# Patient Record
Sex: MALE | Race: WHITE | NOT HISPANIC OR LATINO | URBAN - METROPOLITAN AREA
[De-identification: names, ages, dates, MRNs, and addresses within clinical notes are randomized per-mention and may not be internally consistent; named-entity substitution may affect disease eponyms.]

---

## 2017-12-09 ENCOUNTER — EMERGENCY (EMERGENCY)
Facility: HOSPITAL | Age: 72
LOS: 1 days | Discharge: HOME | End: 2017-12-09
Admitting: HOSPITALIST
Payer: MEDICARE

## 2017-12-09 DIAGNOSIS — Z98.89 OTHER SPECIFIED POSTPROCEDURAL STATES: Chronic | ICD-10-CM

## 2017-12-09 DIAGNOSIS — E11.9 TYPE 2 DIABETES MELLITUS WITHOUT COMPLICATIONS: ICD-10-CM

## 2017-12-09 DIAGNOSIS — Z98.1 ARTHRODESIS STATUS: Chronic | ICD-10-CM

## 2017-12-09 DIAGNOSIS — Z79.84 LONG TERM (CURRENT) USE OF ORAL HYPOGLYCEMIC DRUGS: ICD-10-CM

## 2017-12-09 DIAGNOSIS — Z87.898 PERSONAL HISTORY OF OTHER SPECIFIED CONDITIONS: Chronic | ICD-10-CM

## 2017-12-09 DIAGNOSIS — Z79.891 LONG TERM (CURRENT) USE OF OPIATE ANALGESIC: ICD-10-CM

## 2017-12-09 DIAGNOSIS — E78.00 PURE HYPERCHOLESTEROLEMIA, UNSPECIFIED: ICD-10-CM

## 2017-12-09 DIAGNOSIS — R07.89 OTHER CHEST PAIN: ICD-10-CM

## 2017-12-09 DIAGNOSIS — I10 ESSENTIAL (PRIMARY) HYPERTENSION: ICD-10-CM

## 2017-12-09 DIAGNOSIS — E78.5 HYPERLIPIDEMIA, UNSPECIFIED: ICD-10-CM

## 2017-12-09 DIAGNOSIS — Z79.899 OTHER LONG TERM (CURRENT) DRUG THERAPY: ICD-10-CM

## 2017-12-09 PROCEDURE — 71020: CPT | Mod: 26

## 2017-12-09 PROCEDURE — 99220: CPT

## 2017-12-09 PROCEDURE — 99285 EMERGENCY DEPT VISIT HI MDM: CPT

## 2017-12-09 PROCEDURE — 93010 ELECTROCARDIOGRAM REPORT: CPT

## 2017-12-10 PROCEDURE — 80061 LIPID PANEL: CPT

## 2017-12-10 PROCEDURE — 93306 TTE W/DOPPLER COMPLETE: CPT

## 2017-12-10 PROCEDURE — 82550 ASSAY OF CK (CPK): CPT

## 2017-12-10 PROCEDURE — 83690 ASSAY OF LIPASE: CPT

## 2017-12-10 PROCEDURE — 99284 EMERGENCY DEPT VISIT MOD MDM: CPT | Mod: 25

## 2017-12-10 PROCEDURE — 85610 PROTHROMBIN TIME: CPT

## 2017-12-10 PROCEDURE — 85027 COMPLETE CBC AUTOMATED: CPT

## 2017-12-10 PROCEDURE — 84484 ASSAY OF TROPONIN QUANT: CPT

## 2017-12-10 PROCEDURE — 71046 X-RAY EXAM CHEST 2 VIEWS: CPT

## 2017-12-10 PROCEDURE — G0378: CPT

## 2017-12-10 PROCEDURE — 80076 HEPATIC FUNCTION PANEL: CPT

## 2017-12-10 PROCEDURE — 80048 BASIC METABOLIC PNL TOTAL CA: CPT

## 2017-12-10 PROCEDURE — 99217: CPT

## 2017-12-10 PROCEDURE — 82948 REAGENT STRIP/BLOOD GLUCOSE: CPT

## 2017-12-10 PROCEDURE — 93005 ELECTROCARDIOGRAM TRACING: CPT

## 2017-12-10 PROCEDURE — 93306 TTE W/DOPPLER COMPLETE: CPT | Mod: 26

## 2017-12-10 PROCEDURE — 85730 THROMBOPLASTIN TIME PARTIAL: CPT

## 2017-12-10 PROCEDURE — 93010 ELECTROCARDIOGRAM REPORT: CPT

## 2017-12-10 PROCEDURE — 81003 URINALYSIS AUTO W/O SCOPE: CPT

## 2017-12-10 PROCEDURE — 96360 HYDRATION IV INFUSION INIT: CPT

## 2017-12-10 PROCEDURE — 83036 HEMOGLOBIN GLYCOSYLATED A1C: CPT

## 2017-12-10 PROCEDURE — 96372 THER/PROPH/DIAG INJ SC/IM: CPT | Mod: XU

## 2017-12-10 PROCEDURE — 82553 CREATINE MB FRACTION: CPT

## 2018-07-26 ENCOUNTER — APPOINTMENT (OUTPATIENT)
Dept: ORTHOPEDIC SURGERY | Facility: CLINIC | Age: 73
End: 2018-07-26
Payer: MEDICARE

## 2018-07-26 VITALS
BODY MASS INDEX: 32.13 KG/M2 | SYSTOLIC BLOOD PRESSURE: 149 MMHG | HEIGHT: 68 IN | HEART RATE: 87 BPM | WEIGHT: 212 LBS | DIASTOLIC BLOOD PRESSURE: 73 MMHG

## 2018-07-26 DIAGNOSIS — M47.816 SPONDYLOSIS W/OUT MYELOPATHY OR RADICULOPATHY, LUMBAR REGION: ICD-10-CM

## 2018-07-26 PROCEDURE — 73502 X-RAY EXAM HIP UNI 2-3 VIEWS: CPT | Mod: RT

## 2018-07-26 PROCEDURE — 72100 X-RAY EXAM L-S SPINE 2/3 VWS: CPT

## 2018-07-26 PROCEDURE — 99214 OFFICE O/P EST MOD 30 MIN: CPT

## 2018-07-30 ENCOUNTER — APPOINTMENT (OUTPATIENT)
Dept: MRI IMAGING | Facility: HOSPITAL | Age: 73
End: 2018-07-30
Payer: MEDICARE

## 2018-07-30 ENCOUNTER — OUTPATIENT (OUTPATIENT)
Dept: OUTPATIENT SERVICES | Facility: HOSPITAL | Age: 73
LOS: 1 days | End: 2018-07-30
Payer: MEDICARE

## 2018-07-30 DIAGNOSIS — Z98.89 OTHER SPECIFIED POSTPROCEDURAL STATES: Chronic | ICD-10-CM

## 2018-07-30 DIAGNOSIS — Z87.898 PERSONAL HISTORY OF OTHER SPECIFIED CONDITIONS: Chronic | ICD-10-CM

## 2018-07-30 DIAGNOSIS — Z98.1 ARTHRODESIS STATUS: Chronic | ICD-10-CM

## 2018-07-30 DIAGNOSIS — Z00.8 ENCOUNTER FOR OTHER GENERAL EXAMINATION: ICD-10-CM

## 2018-07-30 PROCEDURE — 73721 MRI JNT OF LWR EXTRE W/O DYE: CPT

## 2018-07-30 PROCEDURE — 73721 MRI JNT OF LWR EXTRE W/O DYE: CPT | Mod: 26,RT

## 2018-08-02 ENCOUNTER — APPOINTMENT (OUTPATIENT)
Dept: ORTHOPEDIC SURGERY | Facility: CLINIC | Age: 73
End: 2018-08-02
Payer: MEDICARE

## 2018-08-02 VITALS — DIASTOLIC BLOOD PRESSURE: 64 MMHG | HEART RATE: 71 BPM | SYSTOLIC BLOOD PRESSURE: 119 MMHG

## 2018-08-02 DIAGNOSIS — M16.11 UNILATERAL PRIMARY OSTEOARTHRITIS, RIGHT HIP: ICD-10-CM

## 2018-08-02 PROCEDURE — 99214 OFFICE O/P EST MOD 30 MIN: CPT

## 2018-08-06 ENCOUNTER — OUTPATIENT (OUTPATIENT)
Dept: OUTPATIENT SERVICES | Facility: HOSPITAL | Age: 73
LOS: 1 days | End: 2018-08-06
Payer: MEDICARE

## 2018-08-06 ENCOUNTER — APPOINTMENT (OUTPATIENT)
Dept: RADIOLOGY | Facility: CLINIC | Age: 73
End: 2018-08-06
Payer: MEDICARE

## 2018-08-06 DIAGNOSIS — M16.11 UNILATERAL PRIMARY OSTEOARTHRITIS, RIGHT HIP: ICD-10-CM

## 2018-08-06 DIAGNOSIS — Z98.89 OTHER SPECIFIED POSTPROCEDURAL STATES: Chronic | ICD-10-CM

## 2018-08-06 DIAGNOSIS — Z98.1 ARTHRODESIS STATUS: Chronic | ICD-10-CM

## 2018-08-06 DIAGNOSIS — Z87.898 PERSONAL HISTORY OF OTHER SPECIFIED CONDITIONS: Chronic | ICD-10-CM

## 2018-08-06 PROCEDURE — 27093 INJECTION FOR HIP X-RAY: CPT

## 2018-08-06 PROCEDURE — 73525 CONTRAST X-RAY OF HIP: CPT

## 2018-08-06 PROCEDURE — 27093 INJECTION FOR HIP X-RAY: CPT | Mod: RT

## 2018-08-06 PROCEDURE — 73525 CONTRAST X-RAY OF HIP: CPT | Mod: 26,RT

## 2018-08-13 PROBLEM — M16.11 PRIMARY LOCALIZED OSTEOARTHRITIS OF RIGHT HIP: Status: ACTIVE | Noted: 2018-07-26

## 2018-09-07 ENCOUNTER — APPOINTMENT (OUTPATIENT)
Dept: SURGERY | Facility: CLINIC | Age: 73
End: 2018-09-07
Payer: MEDICARE

## 2018-09-07 VITALS
BODY MASS INDEX: 32.58 KG/M2 | TEMPERATURE: 98.2 F | WEIGHT: 215 LBS | RESPIRATION RATE: 16 BRPM | SYSTOLIC BLOOD PRESSURE: 145 MMHG | HEART RATE: 63 BPM | OXYGEN SATURATION: 97 % | HEIGHT: 68 IN | DIASTOLIC BLOOD PRESSURE: 81 MMHG

## 2018-09-07 DIAGNOSIS — R10.31 RIGHT LOWER QUADRANT PAIN: ICD-10-CM

## 2018-09-07 DIAGNOSIS — Z87.19 PERSONAL HISTORY OF OTHER DISEASES OF THE DIGESTIVE SYSTEM: ICD-10-CM

## 2018-09-07 PROCEDURE — 99203 OFFICE O/P NEW LOW 30 MIN: CPT

## 2018-09-07 RX ORDER — LISINOPRIL 20 MG/1
20 TABLET ORAL
Refills: 0 | Status: ACTIVE | COMMUNITY

## 2018-09-07 RX ORDER — ASPIRIN/ACETAMINOPHEN/CAFFEINE 500-325-65
325 POWDER IN PACKET (EA) ORAL
Refills: 0 | Status: ACTIVE | COMMUNITY

## 2018-12-17 ENCOUNTER — APPOINTMENT (OUTPATIENT)
Dept: NEUROSURGERY | Facility: CLINIC | Age: 73
End: 2018-12-17
Payer: MEDICARE

## 2018-12-17 VITALS
RESPIRATION RATE: 16 BRPM | DIASTOLIC BLOOD PRESSURE: 74 MMHG | SYSTOLIC BLOOD PRESSURE: 134 MMHG | TEMPERATURE: 98 F | WEIGHT: 219 LBS | HEIGHT: 68 IN | HEART RATE: 69 BPM | OXYGEN SATURATION: 95 % | BODY MASS INDEX: 33.19 KG/M2

## 2018-12-17 PROCEDURE — 99204 OFFICE O/P NEW MOD 45 MIN: CPT

## 2019-02-01 ENCOUNTER — APPOINTMENT (OUTPATIENT)
Dept: ORTHOPEDIC SURGERY | Facility: CLINIC | Age: 74
End: 2019-02-01
Payer: MEDICARE

## 2019-02-01 VITALS — BODY MASS INDEX: 33.19 KG/M2 | HEIGHT: 68 IN | WEIGHT: 219 LBS

## 2019-02-01 DIAGNOSIS — M25.562 PAIN IN LEFT KNEE: ICD-10-CM

## 2019-02-01 DIAGNOSIS — M17.12 UNILATERAL PRIMARY OSTEOARTHRITIS, LEFT KNEE: ICD-10-CM

## 2019-02-01 DIAGNOSIS — M70.42 PREPATELLAR BURSITIS, LEFT KNEE: ICD-10-CM

## 2019-02-01 PROCEDURE — 99213 OFFICE O/P EST LOW 20 MIN: CPT

## 2019-02-01 PROCEDURE — 73564 X-RAY EXAM KNEE 4 OR MORE: CPT | Mod: LT

## 2019-02-01 NOTE — ADDENDUM
[FreeTextEntry1] : I, Xiao Gan, acted solely as a scribe for Dr. Chad Prabhakar on this date 02/01/2019 .

## 2019-02-01 NOTE — DISCUSSION/SUMMARY
[de-identified] : The underlying pathophysiology was reviewed in great detail with the patient as well as the various treatment options, including ice, analgesics, NSAIDs, Physical therapy, steroid injections.\par \par Activity modifications and restrictions were discussed. I recommend avoid kneeling or to utilized knee pads when kneeling. \par \par FU PRN.

## 2019-02-01 NOTE — END OF VISIT
[FreeTextEntry3] : All medical record entries made by the Rmibe were at my, Dr. Chad Prabhakar, direction and personally dictated by me on 02/01/2019. I have reviewed the chart and agree that the record accurately reflects my personal performance of the history, physical exam, assessment and plan. I have also personally directed, reviewed, and agreed with the chart.

## 2019-02-01 NOTE — PHYSICAL EXAM
[Normal RLE] : Right Lower Extremity: No scars, rashes, lesions, ulcers, skin intact [Normal LLE] : Left Lower Extremity: No scars, rashes, lesions, ulcers, skin intact [Normal Touch] : sensation intact for touch [Normal] : No swelling, no edema, normal pedal pulses and normal temperature [Poor Appearance] : well-appearing [Acute Distress] : not in acute distress [Obese] : not obese [de-identified] : Left Lower Extremity\par o Knee :\par ¦ Inspection/Palpation : prepatellar and medial joint line tenderness, no lateral joint line tenderness, no swelling, mild varus alignment\par ¦ Range of Motion : 0 - 110 degrees, no crepitus\par ¦ Stability : no valgus or varus instability present on provocative testing, Lachman’s Test (-)\par ¦ Strength : flexion and extension 5/5\par o Muscle Bulk : normal muscle bulk present\par o Skin : no erythema, no ecchymosis \par o Sensation : sensation to pin intact\par o Vascular Exam : no edema, no cyanosis, dorsalis pedis artery pulse 2+, posterior tibial artery pulse 2+  [de-identified] : o Left Knee : AP, lateral, sunrise, and Lyon views of the knee were obtained, there are no soft tissue abnormalities, no fractures, mild tricompartmental osteoarthritis with moderate medial joint space narrowing, slight calcifications at the lateral condyle

## 2019-02-01 NOTE — HISTORY OF PRESENT ILLNESS
[de-identified] : 73 year old male presents for an evaluation of left knee pain that began approximately 1 week ago when he was kneeling and waxing his living room floor. He reports that he has tenderness to the touch along the medial aspect of his knee and says that last week it was warm to the touch and that has since dissipated. Pt says that he is also says that he experiences stiffness of his left knee. He has no other complaints at this time.

## 2019-07-01 ENCOUNTER — APPOINTMENT (OUTPATIENT)
Dept: CARDIOLOGY | Facility: CLINIC | Age: 74
End: 2019-07-01
Payer: MEDICARE

## 2019-07-01 ENCOUNTER — NON-APPOINTMENT (OUTPATIENT)
Age: 74
End: 2019-07-01

## 2019-07-01 VITALS
SYSTOLIC BLOOD PRESSURE: 187 MMHG | BODY MASS INDEX: 31.98 KG/M2 | HEIGHT: 68 IN | HEART RATE: 69 BPM | DIASTOLIC BLOOD PRESSURE: 75 MMHG | OXYGEN SATURATION: 97 % | WEIGHT: 211 LBS | RESPIRATION RATE: 15 BRPM

## 2019-07-01 DIAGNOSIS — I65.29 OCCLUSION AND STENOSIS OF UNSPECIFIED CAROTID ARTERY: ICD-10-CM

## 2019-07-01 DIAGNOSIS — I10 ESSENTIAL (PRIMARY) HYPERTENSION: ICD-10-CM

## 2019-07-01 DIAGNOSIS — I25.10 ATHEROSCLEROTIC HEART DISEASE OF NATIVE CORONARY ARTERY W/OUT ANGINA PECTORIS: ICD-10-CM

## 2019-07-01 DIAGNOSIS — E78.5 HYPERLIPIDEMIA, UNSPECIFIED: ICD-10-CM

## 2019-07-01 PROCEDURE — 93000 ELECTROCARDIOGRAM COMPLETE: CPT

## 2019-07-01 PROCEDURE — 93306 TTE W/DOPPLER COMPLETE: CPT

## 2019-07-01 PROCEDURE — 99205 OFFICE O/P NEW HI 60 MIN: CPT

## 2019-07-09 ENCOUNTER — APPOINTMENT (OUTPATIENT)
Dept: CARDIOLOGY | Facility: CLINIC | Age: 74
End: 2019-07-09
Payer: MEDICARE

## 2019-07-09 PROCEDURE — 78452 HT MUSCLE IMAGE SPECT MULT: CPT

## 2019-07-09 PROCEDURE — 93015 CV STRESS TEST SUPVJ I&R: CPT

## 2019-07-09 PROCEDURE — A9500: CPT

## 2020-03-09 ENCOUNTER — APPOINTMENT (OUTPATIENT)
Dept: ORTHOPEDIC SURGERY | Facility: CLINIC | Age: 75
End: 2020-03-09
Payer: MEDICARE

## 2020-03-09 PROCEDURE — 73030 X-RAY EXAM OF SHOULDER: CPT | Mod: RT

## 2020-03-09 PROCEDURE — 99213 OFFICE O/P EST LOW 20 MIN: CPT | Mod: 25

## 2020-03-09 PROCEDURE — 20611 DRAIN/INJ JOINT/BURSA W/US: CPT | Mod: RT

## 2020-03-09 NOTE — END OF VISIT
[FreeTextEntry3] : All medical record entries made by the Rmibsatya were at my, Dr. Chad Prabhakar, direction and personally dictated by me on 03/09/2020. I have reviewed the chart and agree that the record accurately reflects my personal performance of the history, physical exam, assessment and plan. I have also personally directed, reviewed, and agreed with the chart.

## 2020-03-09 NOTE — DISCUSSION/SUMMARY
[de-identified] : The underlying pathophysiology was reviewed in great detail with the patient as well as the various treatment options, including ice, analgesics, NSAIDs, Physical therapy, steroid injections, right TSA. \par \par The patient wishes to proceed with an ultrasound-guided DUROLANE injection of the right shoulder. \par \par FU PRN.

## 2020-03-09 NOTE — PROCEDURE
[de-identified] : At this point I recommended a therapeutic injection and under sterile precautions, with ultrasound guidance, an injection of 3 cc of Durolane (Lot: [id number], Expiration: 05/2022)- was placed into the subacromial space of the Right shoulder without complication.

## 2020-03-09 NOTE — PHYSICAL EXAM
[Normal RUE] : Right Upper Extremity: No scars, rashes, lesions, ulcers, skin intact [Normal Touch] : sensation intact for touch [Normal] : No swelling, no edema, normal pedal pulses and normal temperature [Poor Appearance] : well-appearing [Acute Distress] : not in acute distress [Obese] : not obese [de-identified] : Right Upper Extremity\par o Shoulder :\par ¦ Inspection/Palpation : no tenderness, no swelling, no deformities\par ¦ Range of Motion : ACTIVE FORWARD ELEVATION: Measured at 115 degrees, ACTIVE EXTERNAL ROTATION: Measured at 55 degrees, ACTIVE INTERNAL ROTATION: Measured at PSIS \par ¦ Strength : external rotation 5/5, internal rotation 5/5, supraspinatus 5/5\par ¦ Stability : no joint instability on provocative testing\par o Upper Arm : no tenderness, no swelling, no deformities\par o Muscle Bulk : no atrophy\par o Sensation : sensation intact to light touch\par o Skin : no skin rash or discoloration\par o Vascular Exam : no edema, no cyanosis, radial and ulnar pulses normal  [de-identified] : o Right Shoulder : Grashey, Axillary and Outlet views were obtained, there are no soft tissue abnormalities, no fractures, alignment is normal, advanced glenohumeral osteoarthritis with near bone on bone apposition, normal bone density, no bony lesions.

## 2020-03-09 NOTE — HISTORY OF PRESENT ILLNESS
[de-identified] : 74 year old male presents for an evaluation of chronic right shoulder pain. Patient reports a long standing history of right shoulder pain that has worsened drastically over the past three months since December 2019. Patient reports a sharp and burning pain located in the anterior aspect of his right shoulder that is constant in nature. His symptoms are exacerbated with certain shoulder rotations, and notes limited ROM in internal rotation and flexion. He denies that his shoulder pain wakes him at night. Patient has taken Advil, Aleve and Mobic for pain relief with mild relief in his symptoms.

## 2020-03-09 NOTE — ADDENDUM
[FreeTextEntry1] : I, Ericka Ron, acted solely as a scribe for Dr. Chad Prabhakar on this date 03/09/2020.

## 2020-05-14 ENCOUNTER — OUTPATIENT (OUTPATIENT)
Dept: OUTPATIENT SERVICES | Facility: HOSPITAL | Age: 75
LOS: 1 days | End: 2020-05-14
Payer: MEDICARE

## 2020-05-14 ENCOUNTER — APPOINTMENT (OUTPATIENT)
Dept: MRI IMAGING | Facility: CLINIC | Age: 75
End: 2020-05-14
Payer: MEDICARE

## 2020-05-14 DIAGNOSIS — Z98.1 ARTHRODESIS STATUS: Chronic | ICD-10-CM

## 2020-05-14 DIAGNOSIS — Z87.898 PERSONAL HISTORY OF OTHER SPECIFIED CONDITIONS: Chronic | ICD-10-CM

## 2020-05-14 DIAGNOSIS — Z00.8 ENCOUNTER FOR OTHER GENERAL EXAMINATION: ICD-10-CM

## 2020-05-14 DIAGNOSIS — Z98.89 OTHER SPECIFIED POSTPROCEDURAL STATES: Chronic | ICD-10-CM

## 2020-05-14 PROCEDURE — 70549 MR ANGIOGRAPH NECK W/O&W/DYE: CPT | Mod: 26

## 2020-05-14 PROCEDURE — A9585: CPT

## 2020-05-14 PROCEDURE — 70549 MR ANGIOGRAPH NECK W/O&W/DYE: CPT

## 2020-11-02 ENCOUNTER — OUTPATIENT (OUTPATIENT)
Dept: OUTPATIENT SERVICES | Facility: HOSPITAL | Age: 75
LOS: 1 days | End: 2020-11-02
Payer: MEDICARE

## 2020-11-02 ENCOUNTER — APPOINTMENT (OUTPATIENT)
Dept: MRI IMAGING | Facility: HOSPITAL | Age: 75
End: 2020-11-02
Payer: MEDICARE

## 2020-11-02 ENCOUNTER — TRANSCRIPTION ENCOUNTER (OUTPATIENT)
Age: 75
End: 2020-11-02

## 2020-11-02 DIAGNOSIS — Z98.89 OTHER SPECIFIED POSTPROCEDURAL STATES: Chronic | ICD-10-CM

## 2020-11-02 DIAGNOSIS — Z00.8 ENCOUNTER FOR OTHER GENERAL EXAMINATION: ICD-10-CM

## 2020-11-02 DIAGNOSIS — Z87.898 PERSONAL HISTORY OF OTHER SPECIFIED CONDITIONS: Chronic | ICD-10-CM

## 2020-11-02 DIAGNOSIS — Z98.1 ARTHRODESIS STATUS: Chronic | ICD-10-CM

## 2020-11-02 PROCEDURE — 73721 MRI JNT OF LWR EXTRE W/O DYE: CPT

## 2020-11-02 PROCEDURE — 73721 MRI JNT OF LWR EXTRE W/O DYE: CPT | Mod: 26,LT

## 2020-12-10 ENCOUNTER — APPOINTMENT (OUTPATIENT)
Dept: ORTHOPEDIC SURGERY | Facility: CLINIC | Age: 75
End: 2020-12-10
Payer: MEDICARE

## 2020-12-10 VITALS — WEIGHT: 211 LBS | BODY MASS INDEX: 31.98 KG/M2 | HEIGHT: 68 IN

## 2020-12-10 DIAGNOSIS — S76.111A STRAIN OF RIGHT QUADRICEPS MUSCLE, FASCIA AND TENDON, INITIAL ENCOUNTER: ICD-10-CM

## 2020-12-10 DIAGNOSIS — M77.01 MEDIAL EPICONDYLITIS, RIGHT ELBOW: ICD-10-CM

## 2020-12-10 DIAGNOSIS — M77.02 MEDIAL EPICONDYLITIS, LEFT ELBOW: ICD-10-CM

## 2020-12-10 PROCEDURE — 99214 OFFICE O/P EST MOD 30 MIN: CPT

## 2020-12-10 PROCEDURE — 73564 X-RAY EXAM KNEE 4 OR MORE: CPT | Mod: RT

## 2020-12-12 NOTE — PHYSICAL EXAM
[Normal RUE] : Right Upper Extremity: No scars, rashes, lesions, ulcers, skin intact [Normal Touch] : sensation intact for touch [Normal] : No swelling, no edema, normal pedal pulses and normal temperature [Poor Appearance] : well-appearing [Acute Distress] : not in acute distress [Obese] : not obese [de-identified] : Right Upper Extremity\par o Elbow :\par ¦ Inspection/Palpation : tenderness to palpation medial epicondyle, no swelling, no deformities\par ¦ Range of Motion : full and painless in all planes, no crepitus\par ¦ Strength : flexion and extension 5/5\par ¦ Stability : no joint instability on provocative testing\par o Muscle Bulk : no atrophy\par o Sensation : sensation intact to light touch\par o Skin : no skin lesions, no discoloration\par o Vascular Exam : no edema, no cyanosis, radial and ulnar pulses normal \par o Special Tests: pain with resisted wrist flexion. \par \par Left Upper Extremity\par o Elbow :\par ¦ Inspection/Palpation: tenderness to palpation medial epicondyle, no swelling, no deformities\par ¦ Range of Motion : full and painless in all planes, no crepitus\par ¦ Strength : flexion and extension 5/5\par ¦ Stability : no joint instability on provocative testing\par o Muscle Bulk : no atrophy\par o Sensation : sensation intact to light touch\par o Skin : no skin lesions, no discoloration\par o Vascular Exam : no edema, no cyanosis, radial and ulnar pulses normal \par o Special Tests: pain with resisted wrist flexion. \par \par Right Lower Extremity\par o Knee :\par ¦ Inspection/Palpation : no tenderness to palpation, no swelling, no deformity\par ¦ Range of Motion : 0 - 120 degrees, no crepitus\par ¦ Stability : no valgus or varus instability present on provocative testing, Lachman’s Test (-)\par ¦ Strength : flexion and extension 5/5\par o Muscle Bulk : normal muscle bulk present\par o Skin : no erythema, no ecchymosis\par o Sensation : sensation to pin intact\par o Vascular Exam : no edema, no cyanosis, dorsalis pedis artery pulse 2+, posterior tibial artery pulse 2+\par \par Left Lower Extremity\par o Knee :\par ¦ Inspection/Palpation : no tenderness to palpation, no swelling, no deformity\par ¦ Range of Motion : 0 -120 degrees, no crepitus\par ¦ Stability : no valgus or varus instability present on provocative testing, Lachman’s Test (-)\par ¦ Strength : flexion and extension 5/5\par o Muscle Bulk : normal muscle bulk present\par o Skin : no erythema, no ecchymosis\par o Sensation : sensation to pin intact\par o Vascular Exam : no edema, no cyanosis, dorsalis pedis artery pulse 2+, posterior tibial artery pulse 2+ [de-identified] : o Right Knee : AP, lateral, sunrise, and Lyon views of the knee were obtained, there are no soft tissue abnormalities, no fractures, alignment is normal, normal appearing joint spaces, normal bone density, no bony lesions, calcification in distal quadriceps muscle, mild tricompartmental osteoarthritis, chronic tibial tubercle apophysitis\par \par

## 2020-12-12 NOTE — DISCUSSION/SUMMARY
[de-identified] : The underlying pathophysiology was reviewed in great detail with the patient as well as the various treatment options, including ice, analgesics, NSAIDs, Physical therapy, steroid injections, hyaluronic gel injections. \par \par Activity modifications and restrictions were discussed. I recommend that he  avoid heavy gripping/squeezing. \par \par Activity modifications and restrictions were discussed. I advised avoiding deep bending, squatting and high intensity activity.\par \par A home exercise sheet was given and discussed with the patient to follow.\par \par FU 6 weeks or prn. \par \par All questions were answered, all alternatives discussed and the patient is in complete agreement with that plan. Follow-up appointment as instructed. Any issues and the patient will call or come in sooner.

## 2020-12-12 NOTE — HISTORY OF PRESENT ILLNESS
[de-identified] : CHRISTY VILLANUEVA is a 75 year male presenting to the office complaining of right knee and bilateral elbow pain. He  presents to the office ambulating independently. Patient reports knee and quadriceps pain began on 12/04/2020 when he tripped up the stairs landing on his right knee.   The patient describes the pain as a dull aching, and occasionally sharp pain localized to the anterior medial aspect of his right quadriceps that is intermittent in nature. His  symptoms are exacerbated with weightbearing but his pain has decreased since initial onset.  Pain is alleviated with rest.  Patient denies instability, catching or locking of the knee. 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 \par He is also complaining of bilateral elbow pain. Patient reports pain intermittently for months, with worsening pain over the past few weeks.  Patient denies injury or trauma to the area. The patient describes the pain as a dull aching, and occasionally sharp pain localized to the medial aspect of his bilateral elbows that is intermittent in nature. His  symptoms are exacerbated with any movement of the elbow, and gripping of the hand. Patient reports the pain is not waking him  up at night.  Patient reports associated weakness. Denies numbness and tingling in the upper extremity. Patient is taking Tylenol for pain relief with moderate relief in symptoms. Patient denies any other complaints at this time.

## 2020-12-22 ENCOUNTER — EMERGENCY (EMERGENCY)
Facility: HOSPITAL | Age: 75
LOS: 1 days | Discharge: HOME | End: 2020-12-22
Attending: INTERNAL MEDICINE | Admitting: INTERNAL MEDICINE
Payer: MEDICARE

## 2020-12-22 VITALS
OXYGEN SATURATION: 97 % | SYSTOLIC BLOOD PRESSURE: 112 MMHG | HEART RATE: 66 BPM | TEMPERATURE: 99 F | RESPIRATION RATE: 18 BRPM | DIASTOLIC BLOOD PRESSURE: 65 MMHG | WEIGHT: 212.08 LBS | HEIGHT: 68 IN

## 2020-12-22 DIAGNOSIS — Z98.1 ARTHRODESIS STATUS: Chronic | ICD-10-CM

## 2020-12-22 DIAGNOSIS — Z98.89 OTHER SPECIFIED POSTPROCEDURAL STATES: Chronic | ICD-10-CM

## 2020-12-22 DIAGNOSIS — Z87.898 PERSONAL HISTORY OF OTHER SPECIFIED CONDITIONS: Chronic | ICD-10-CM

## 2020-12-22 DIAGNOSIS — Z20.828 CONTACT WITH AND (SUSPECTED) EXPOSURE TO OTHER VIRAL COMMUNICABLE DISEASES: ICD-10-CM

## 2020-12-22 LAB — SARS-COV-2 RNA SPEC QL NAA+PROBE: SIGNIFICANT CHANGE UP

## 2020-12-22 PROCEDURE — U0003: CPT

## 2020-12-22 PROCEDURE — 99283 EMERGENCY DEPT VISIT LOW MDM: CPT | Mod: CS

## 2020-12-22 PROCEDURE — 99283 EMERGENCY DEPT VISIT LOW MDM: CPT

## 2020-12-22 NOTE — ED PROVIDER NOTE - ATTENDING CONTRIBUTION TO CARE
76 y/o M with PMH of DM, HLD, gout, HTN presents to the ED requesting a COVID swab. patient was in close contact with a friend whom tested positive for COVID 2-3 days ago. Pt denies all medical symptoms. COVID swab sent. patient instructed to quarantine until results  Dr. Burroughs:  I have reviewed and discussed with the PA/ resident the case specifics, including the history, physical assessment, evaluation, conclusion, laboratory results, and medical plan. I agree with the contents, and conclusions. I have personally examined, and interviewed the patient.

## 2020-12-22 NOTE — ED PROVIDER NOTE - NSFOLLOWUPINSTRUCTIONS_ED_ALL_ED_FT
See attached for COVID-19 info / fact sheet.   Please stay home for 10 days.   Take Tylenol 650mg every 6 hours as needed for fever or pain.   Drink fluids, rest, and sanitize at home!  Home quarantine is recommended to monitor symptoms.   Isolate from others as much as possible.   We sent COVID testing that may take up to 2 days for you to receive a result.  We will contact you if there are any findings.   Worsening, continued or ANY new concerning symptoms return to the emergency department.

## 2020-12-22 NOTE — ED PROVIDER NOTE - OBJECTIVE STATEMENT
74 y/o M with PMH of DM, HLD, gout, HTN presents to the ED requesting a COVID swab. patient was in close contact with a friend whom tested positive for COVID 2-3 days ago. Pt denies all medical symptoms

## 2020-12-22 NOTE — ED PROVIDER NOTE - PMH
Chronic pain  lumbar, left hip  Diabetes type 2, controlled    Gout    [place] (hard of hearing), bilateral  b/l hearing aids  HTN (hypertension)    Hypercholesteremia    Meniscus tear    Seizure disorder  on meds last seizure 2011  Septicemia  Pt states he had spsis 2010 and was in hospital for 40 days, sent home on IV antibiotics  Spinal stenosis of thoracic region    Tennis elbow    Thoracic spondylosis

## 2020-12-22 NOTE — ED PROVIDER NOTE - PHYSICAL EXAMINATION
Gen: Well appearing in NAD  Head: NC/AT  Neck: trachea midline  Resp:  No distress, CTA b/l   Heart: s1/s2, RRR  Ext: no deformities  Neuro:  A&O appears non focal  Skin:  Warm and dry as visualized  Psych:  Normal affect and mood

## 2020-12-22 NOTE — ED ADULT NURSE NOTE - PMH
Chronic pain  lumbar, left hip  Diabetes type 2, controlled    Gout    [place] (hard of hearing), bilateral  b/l hearing aids  HTN (hypertension)    Hypercholesteremia    Meniscus tear    Seizure disorder  on meds last seizure 2011  Septicemia  Pt states he had spsis 2010 and was in hospital for 40 days, sent home on IV antibiotics  Spinal stenosis of thoracic region    Tennis elbow    Thoracic spondylosis    
no

## 2020-12-22 NOTE — ED PROVIDER NOTE - CLINICAL SUMMARY MEDICAL DECISION MAKING FREE TEXT BOX
76 y/o M with PMH of DM, HLD, gout, HTN presents to the ED requesting a COVID swab. patient was in close contact with a friend whom tested positive for COVID 2-3 days ago. Pt denies all medical symptoms. COVID swab sent. patient instructed to quarantine until results

## 2020-12-22 NOTE — ED PROVIDER NOTE - PATIENT PORTAL LINK FT
You can access the FollowMyHealth Patient Portal offered by [facility] by registering at the following website: [email or web address]. By joining [facility]’s FollowMyHealth portal, you will also be able to view your health information using other applications (apps) compatible with our system.

## 2020-12-22 NOTE — ED PROVIDER NOTE - PSH
H/O cardiac catheterization  2010 c/o tightness chest , cardiac workup normal  History of tennis elbow  s/p surgery b/l  S/P knee surgery  b/l  S/P lumbar fusion  2009

## 2021-05-20 ENCOUNTER — APPOINTMENT (OUTPATIENT)
Dept: NEUROLOGY | Facility: CLINIC | Age: 76
End: 2021-05-20
Payer: MEDICARE

## 2021-05-20 VITALS
HEIGHT: 68 IN | SYSTOLIC BLOOD PRESSURE: 132 MMHG | HEART RATE: 70 BPM | WEIGHT: 210 LBS | DIASTOLIC BLOOD PRESSURE: 76 MMHG | BODY MASS INDEX: 31.83 KG/M2

## 2021-05-20 VITALS
HEART RATE: 70 BPM | BODY MASS INDEX: 31.83 KG/M2 | SYSTOLIC BLOOD PRESSURE: 132 MMHG | HEIGHT: 68 IN | WEIGHT: 210 LBS | DIASTOLIC BLOOD PRESSURE: 76 MMHG

## 2021-05-20 DIAGNOSIS — M47.814 SPONDYLOSIS W/OUT MYELOPATHY OR RADICULOPATHY, THORACIC REGION: ICD-10-CM

## 2021-05-20 PROCEDURE — 99205 OFFICE O/P NEW HI 60 MIN: CPT

## 2021-05-20 NOTE — ASSESSMENT
[FreeTextEntry1] : Impression: This 75-year-old gentleman presents with an approximate 6-month history of imbalance and a gait disorder.  He has a history of diabetes hypertension hyperlipidemia gout and glaucoma and is status post thoracolumbar spinal surgeries for spondylosis/stenosis.  He does have spinal deformity with flexed stooped posture.  Suspect his complaints could be due to prior spinal surgeries however his exam does not reveal definite signs of myeloradiculopathy.  He may have a diabetic peripheral neuropathy.  Rule out central etiology for his complaints.  There is remote history of a seizure disorder though I doubt his present complaints are related.\par \par Recommendations: MRI brain with and without contrast.  MR noncontrast of thoracic and lumbar spine.  EMG/NCV studies lower extremities.  Blood test work-up will be obtained.  Office follow-up.  He does not\par

## 2021-05-20 NOTE — PHYSICAL EXAM
[FreeTextEntry1] : Head:  Normocephalic Neck: Supple nontender no carotid bruits.  Spine:  Nontender reduced forward bending negative straight leg raising.  Stooped posture\par \par Mental Status:  Alert Oriented X3 Speech normal and no aphasia or dysarthria.\par \par Cranial Nerves:  PERRL, Fundi normal Visual Fields full  EOMI no diplopia no ptosis no nystagmus, V through XII intact.  Bilateral decreased hearing chronic by history.\par \par Motor:  No drift, normal strength tone and coordination and no focal atrophy. No abnormal movements. No dysmetria.  Normal rapid alternating movements. \par \par DTRs: Symmetric hypoactive basically absent in the lowers.  Plantars flexor.  No Clonus.\par \par Sensory: Reduced pinprick and vibration distally in the lowers more on the left lower limb.\par \par Gait: Stooped posture somewhat bradykinetic unable to effectively tandem walk sways in Romberg position.\par

## 2021-05-20 NOTE — CONSULT LETTER
[Dear  ___] : Dear  [unfilled], [Consult Letter:] : I had the pleasure of evaluating your patient, [unfilled]. [Please see my note below.] : Please see my note below. [Referral Closing:] : Thank you very much for seeing this patient.  If you have any questions, please do not hesitate to contact me. [Sincerely,] : Sincerely, [FreeTextEntry3] : Jeremy Craig MD\par

## 2021-05-20 NOTE — HISTORY OF PRESENT ILLNESS
[FreeTextEntry1] : This patient is a 75-year-old right-handed gentleman who presents with a complaint of unsteady gait and imbalance for approximately the last 6 months.  He has lightheadedness he denies vertigo or syncope.  He gets occasional poorly characterized headache.  He status post lumbar laminectomy for discogenic disease in 2009 and he also had a thoracic spinal surgery in 2015 for similar problem.  He status post bilateral carotid endarterectomy in 2020 for asymptomatic disease.  Several years ago he was seen by me for suspected seizure and received Keppra which she ultimately disc did discontinue.  MRI of the brain performed in the past was unremarkable.  He had an EEG performed but I do not have that report and those prior records not available to me at this time.  He is no longer taking antiseizure medication and he has no symptomatology to suggest  seizure for many years.  Occasionally the left lower extremity well be perceived as being weak or numb but this is inconsistent and he relates this to his back condition.\par \par He has a past history of hypertension hyperlipidemia diabetes gout and glaucoma.

## 2021-05-20 NOTE — REASON FOR VISIT
[Initial Evaluation] : an initial evaluation [FreeTextEntry1] : Unsteady gait imbalance for the last approximately 6 months

## 2021-05-21 ENCOUNTER — LABORATORY RESULT (OUTPATIENT)
Age: 76
End: 2021-05-21

## 2021-05-21 LAB
ALBUMIN SERPL ELPH-MCNC: 4.2 G/DL
ALP BLD-CCNC: 96 U/L
ALT SERPL-CCNC: 25 U/L
ANION GAP SERPL CALC-SCNC: 13 MMOL/L
AST SERPL-CCNC: 26 U/L
BILIRUB SERPL-MCNC: 0.9 MG/DL
BUN SERPL-MCNC: 33 MG/DL
CALCIUM SERPL-MCNC: 9.3 MG/DL
CHLORIDE SERPL-SCNC: 101 MMOL/L
CK SERPL-CCNC: 293 U/L
CO2 SERPL-SCNC: 22 MMOL/L
CREAT SERPL-MCNC: 1.27 MG/DL
GLUCOSE SERPL-MCNC: 205 MG/DL
POTASSIUM SERPL-SCNC: 5.2 MMOL/L
PROT SERPL-MCNC: 6.8 G/DL
SODIUM SERPL-SCNC: 137 MMOL/L

## 2021-05-24 LAB
B BURGDOR IGG+IGM SER QL IB: NORMAL
BASOPHILS # BLD AUTO: 0.01 K/UL
BASOPHILS NFR BLD AUTO: 0.1 %
EOSINOPHIL # BLD AUTO: 0 K/UL
EOSINOPHIL NFR BLD AUTO: 0 %
ERYTHROCYTE [SEDIMENTATION RATE] IN BLOOD BY WESTERGREN METHOD: 27 MM/HR
ESTIMATED AVERAGE GLUCOSE: 157 MG/DL
FOLATE SERPL-MCNC: >20 NG/ML
HBA1C MFR BLD HPLC: 7.1 %
HCT VFR BLD CALC: 41.9 %
HGB BLD-MCNC: 13.8 G/DL
IMM GRANULOCYTES NFR BLD AUTO: 0.2 %
LYMPHOCYTES # BLD AUTO: 0.66 K/UL
LYMPHOCYTES NFR BLD AUTO: 6.7 %
MAN DIFF?: NORMAL
MCHC RBC-ENTMCNC: 32.9 GM/DL
MCHC RBC-ENTMCNC: 33.8 PG
MCV RBC AUTO: 102.7 FL
MONOCYTES # BLD AUTO: 0.68 K/UL
MONOCYTES NFR BLD AUTO: 6.9 %
NEUTROPHILS # BLD AUTO: 8.46 K/UL
NEUTROPHILS NFR BLD AUTO: 86.1 %
PLATELET # BLD AUTO: 220 K/UL
RBC # BLD: 4.08 M/UL
RBC # FLD: 12.8 %
T PALLIDUM AB SER QL IA: NEGATIVE
TSH SERPL-ACNC: 0.73 UIU/ML
VIT B12 SERPL-MCNC: 538 PG/ML
WBC # FLD AUTO: 9.83 K/UL

## 2021-05-25 ENCOUNTER — NON-APPOINTMENT (OUTPATIENT)
Age: 76
End: 2021-05-25

## 2021-05-25 LAB
ALBUMIN MFR SERPL ELPH: 59.7 %
ALBUMIN SERPL-MCNC: 4.1 G/DL
ALBUMIN/GLOB SERPL: 1.5 RATIO
ALPHA1 GLOB MFR SERPL ELPH: 3.5 %
ALPHA1 GLOB SERPL ELPH-MCNC: 0.2 G/DL
ALPHA2 GLOB MFR SERPL ELPH: 12.2 %
ALPHA2 GLOB SERPL ELPH-MCNC: 0.8 G/DL
B-GLOBULIN MFR SERPL ELPH: 11.4 %
B-GLOBULIN SERPL ELPH-MCNC: 0.8 G/DL
GAMMA GLOB FLD ELPH-MCNC: 0.9 G/DL
GAMMA GLOB MFR SERPL ELPH: 13.2 %
INTERPRETATION SERPL IEP-IMP: NORMAL
M PROTEIN MFR SERPL ELPH: NORMAL
MONOCLON BAND OBS SERPL: NORMAL
PROT SERPL-MCNC: 6.8 G/DL
PROT SERPL-MCNC: 6.8 G/DL

## 2021-05-26 LAB — ANA SER IF-ACNC: NEGATIVE

## 2021-06-02 ENCOUNTER — RESULT REVIEW (OUTPATIENT)
Age: 76
End: 2021-06-02

## 2021-06-02 ENCOUNTER — OUTPATIENT (OUTPATIENT)
Dept: OUTPATIENT SERVICES | Facility: HOSPITAL | Age: 76
LOS: 1 days | End: 2021-06-02
Payer: MEDICARE

## 2021-06-02 ENCOUNTER — APPOINTMENT (OUTPATIENT)
Dept: MRI IMAGING | Facility: HOSPITAL | Age: 76
End: 2021-06-02
Payer: MEDICARE

## 2021-06-02 DIAGNOSIS — Z98.89 OTHER SPECIFIED POSTPROCEDURAL STATES: Chronic | ICD-10-CM

## 2021-06-02 DIAGNOSIS — Z87.898 PERSONAL HISTORY OF OTHER SPECIFIED CONDITIONS: Chronic | ICD-10-CM

## 2021-06-02 DIAGNOSIS — Z98.1 ARTHRODESIS STATUS: Chronic | ICD-10-CM

## 2021-06-02 DIAGNOSIS — R27.0 ATAXIA, UNSPECIFIED: ICD-10-CM

## 2021-06-02 PROCEDURE — 70553 MRI BRAIN STEM W/O & W/DYE: CPT | Mod: 26,ME

## 2021-06-02 PROCEDURE — 70553 MRI BRAIN STEM W/O & W/DYE: CPT

## 2021-06-02 PROCEDURE — G1004: CPT

## 2021-06-02 PROCEDURE — 72157 MRI CHEST SPINE W/O & W/DYE: CPT | Mod: 26,ME

## 2021-06-02 PROCEDURE — 72157 MRI CHEST SPINE W/O & W/DYE: CPT

## 2021-06-02 PROCEDURE — A9579: CPT

## 2021-06-02 PROCEDURE — 72158 MRI LUMBAR SPINE W/O & W/DYE: CPT | Mod: 26,ME

## 2021-06-02 PROCEDURE — 72158 MRI LUMBAR SPINE W/O & W/DYE: CPT

## 2021-06-11 ENCOUNTER — APPOINTMENT (OUTPATIENT)
Dept: NEUROLOGY | Facility: CLINIC | Age: 76
End: 2021-06-11
Payer: MEDICARE

## 2021-06-11 VITALS
DIASTOLIC BLOOD PRESSURE: 78 MMHG | BODY MASS INDEX: 31.07 KG/M2 | HEIGHT: 68 IN | HEART RATE: 80 BPM | WEIGHT: 205 LBS | SYSTOLIC BLOOD PRESSURE: 136 MMHG

## 2021-06-11 VITALS
HEART RATE: 80 BPM | BODY MASS INDEX: 31.07 KG/M2 | HEIGHT: 68 IN | SYSTOLIC BLOOD PRESSURE: 136 MMHG | DIASTOLIC BLOOD PRESSURE: 78 MMHG | WEIGHT: 205 LBS

## 2021-06-11 PROCEDURE — 99215 OFFICE O/P EST HI 40 MIN: CPT

## 2021-06-11 NOTE — DATA REVIEWED
[de-identified] : MRI of brain on 6/2/2021 revealed chronic micro vasculopathy.  MRI of thoracolumbar spine on 6/2/2021 revealed worsening moderate to severe spinal canal stenosis at the T10-T11 level and multilevel degenerative change as well as status post fusion T11-L5.

## 2021-06-11 NOTE — CONSULT LETTER
[Dear  ___] : Dear  [unfilled], [Consult Closing:] : Thank you very much for allowing me to participate in the care of this patient.  If you have any questions, please do not hesitate to contact me. [Sincerely,] : Sincerely, [FreeTextEntry3] : Jeremy Craig MD\par

## 2021-06-11 NOTE — REASON FOR VISIT
[Follow-Up: _____] : a [unfilled] follow-up visit [FreeTextEntry1] : Progressive imbalance and unsteady gait

## 2021-06-11 NOTE — PHYSICAL EXAM
[FreeTextEntry1] : Head:  Normocephalic Neck: Supple nontender no carotid bruits.  Spine:  Nontender reduced forward bending spinal deformity to posture negative straight leg raising.\par \par Mental Status:  Alert Oriented X3 Speech normal and no aphasia or dysarthria.\par \par Cranial Nerves:  PERRL, Fundi normal Visual Fields full  EOMI no diplopia no ptosis no nystagmus, V through XII intact.\par \par Motor:  No drift, normal strength tone and coordination and no focal atrophy. No abnormal movements. No dysmetria.  Normal rapid alternating movements. \par \par DTRs: Symmetric and hypoactive essentially absent in the lowers.  Plantars flexor.  No Clonus.\par \par Sensory: Reduced pinprick and vibration distally in the lower extremities more so on the left.\par \par Gait: Stooped posture unsteady in tandem and Romberg unchanged from the prior exam.\par

## 2021-06-11 NOTE — ASSESSMENT
[FreeTextEntry1] : Impression: This 75-year-old patient has an approximate 6-month history of imbalance and unsteady gait.  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  Prior brain revealed chronic small vessel changes . MRI of thoracolumbar spine reveals postoperative changes and worsening spinal stenosis at the T10-T11 level.  His neurological exam remains consistent with spinal deformity having had prior surgery and peripheral neuropathy.  Suspect a multifactorial gait disorder.\par \par Recommendations: Hematology consult regarding the gamma migrating paraprotein.  After discussion with the patient EMG and nerve conduction testing will be deferred.  The results will probably not change the treatment.  Doubt myopathy spite of the mild elevation of CPK.  Neurological follow-up only on an as-needed basis.\par

## 2021-06-11 NOTE — HISTORY OF PRESENT ILLNESS
[FreeTextEntry1] : Patient presents for an office visit.  There is no clinical change in his unsteady gait.  Blood test revealed CPK of 293 with a range of  and serum protein electrophoresis revealed 2-weak gamma migrating paraproteins.  MRIs of brain and thoracolumbar spine on 6/2/2021 revealed chronic micro vasculopathy prior extensive thoracolumbar spinal fusion and spinal stenosis at the T10-11 level.

## 2021-06-25 ENCOUNTER — NON-APPOINTMENT (OUTPATIENT)
Age: 76
End: 2021-06-25

## 2021-07-07 ENCOUNTER — OUTPATIENT (OUTPATIENT)
Dept: OUTPATIENT SERVICES | Facility: HOSPITAL | Age: 76
LOS: 1 days | Discharge: HOME | End: 2021-07-07

## 2021-07-07 DIAGNOSIS — Z98.89 OTHER SPECIFIED POSTPROCEDURAL STATES: Chronic | ICD-10-CM

## 2021-07-07 DIAGNOSIS — Z87.898 PERSONAL HISTORY OF OTHER SPECIFIED CONDITIONS: Chronic | ICD-10-CM

## 2021-07-07 DIAGNOSIS — Z98.1 ARTHRODESIS STATUS: Chronic | ICD-10-CM

## 2021-07-07 DIAGNOSIS — D64.9 ANEMIA, UNSPECIFIED: ICD-10-CM

## 2021-07-08 ENCOUNTER — APPOINTMENT (OUTPATIENT)
Dept: ORTHOPEDIC SURGERY | Facility: CLINIC | Age: 76
End: 2021-07-08
Payer: MEDICARE

## 2021-07-08 ENCOUNTER — APPOINTMENT (OUTPATIENT)
Dept: HEMATOLOGY ONCOLOGY | Facility: CLINIC | Age: 76
End: 2021-07-08
Payer: MEDICARE

## 2021-07-08 VITALS
HEIGHT: 67.99 IN | OXYGEN SATURATION: 96 % | RESPIRATION RATE: 16 BRPM | SYSTOLIC BLOOD PRESSURE: 173 MMHG | DIASTOLIC BLOOD PRESSURE: 75 MMHG | WEIGHT: 213.83 LBS | BODY MASS INDEX: 32.41 KG/M2 | HEART RATE: 85 BPM | TEMPERATURE: 97.5 F

## 2021-07-08 VITALS — BODY MASS INDEX: 31.83 KG/M2 | HEIGHT: 68 IN | WEIGHT: 210 LBS

## 2021-07-08 DIAGNOSIS — Z12.5 ENCOUNTER FOR SCREENING FOR MALIGNANT NEOPLASM OF PROSTATE: ICD-10-CM

## 2021-07-08 DIAGNOSIS — M79.672 PAIN IN LEFT FOOT: ICD-10-CM

## 2021-07-08 PROCEDURE — 99214 OFFICE O/P EST MOD 30 MIN: CPT

## 2021-07-08 PROCEDURE — 99204 OFFICE O/P NEW MOD 45 MIN: CPT

## 2021-07-08 RX ORDER — LATANOPROST/PF 0.005 %
0.01 DROPS OPHTHALMIC (EYE)
Refills: 0 | Status: ACTIVE | COMMUNITY

## 2021-07-08 NOTE — CONSULT LETTER
[Dear  ___] : Dear  [unfilled], [Consult Letter:] : I had the pleasure of evaluating your patient, [unfilled]. [Please see my note below.] : Please see my note below. [Consult Closing:] : Thank you very much for allowing me to participate in the care of this patient.  If you have any questions, please do not hesitate to contact me. [Sincerely,] : Sincerely, [DrBridgett  ___] : Dr. LAST [FreeTextEntry3] : Mansi Cesar MD

## 2021-07-08 NOTE — HISTORY OF PRESENT ILLNESS
[de-identified] : 7/2021-Patient presenting at the request of his neurologist for an abnormal serum immunofixation revealing 2-weak IgA kappa bands.  He had been undergoing a neurologic evaluation for a 6-month history of imbalance and unsteady gait.  Dr. Craig felt his neurological exam remained consistent with spinal deformity having had prior surgery and peripheral neuropathy. Suspected a multifactorial gait disorder.\par \par Got Covid vaccines (Moderna).\par No current pulmonary, GI/ complaints. No fevers. No h/o bleeding. No LN complaints.\par

## 2021-07-08 NOTE — CONSULT LETTER
[Dear  ___] : Dear  [unfilled], [Consult Letter:] : I had the pleasure of evaluating your patient, [unfilled]. [Please see my note below.] : Please see my note below. [Consult Closing:] : Thank you very much for allowing me to participate in the care of this patient.  If you have any questions, please do not hesitate to contact me. [Sincerely,] : Sincerely, [FreeTextEntry3] : Dr. Chad Prabhakar \par \par

## 2021-07-08 NOTE — HISTORY OF PRESENT ILLNESS
[de-identified] : CHRISTY VILLANUEVA is a 75 year  male  presenting to the office complaining of left foot and ankle pain. He presents to the office ambulating independently.  Patient reports worsening pain over the past year. Denies injury or trauma to the area. He notes he has previously been seen by a podiatrist and a different orthopedist. He had xrays stating they were negative for acute fracture/dislocation.  He had a MRI of the ankle at [facility], and he presents today with the images for review. Patient notes he had a corticosteroid injection at the location of pain with minimal relief in symptoms. \par Currently, the patient describes the pain as a dull aching, and occasionally sharp pain localized to lateral aspect of the foot that is intermittent in nature.  symptoms are exacerbated with weightbearing on the foot. Pain is alleviated with rest.  Patient denies instability or weakness. Patient is taking Tylenol for pain relief with mild to moderate relief in His symptoms. Patient denies any other complaints at this time.

## 2021-07-08 NOTE — DISCUSSION/SUMMARY
[de-identified] : The underlying pathophysiology was reviewed in great detail with the patient as well as the various treatment options, including ice, analgesics, NSAIDs, Physical therapy, steroid injections, foot specialist referral, surgical intervention. \par \par MRI of the left ankle reviewed and discussed in great detail today. \par \par Activity modifications and restrictions were discussed. Discussed the importance of proprioception.\par \par A home exercise sheet was given and discussed with the patient to follow.A Thera-Band was provided for exercise program. \par \par Discussed utilization of a supportive shoe or over the counter orthotics. \par \par Discussed use of relief padding at the location of pain. Recommend use of OTC  Mole skin, or a donut pad. \par  \par FU 6 weeks. \par \par All questions were answered, all alternatives discussed and the patient is in complete agreement with that plan. Follow-up appointment as instructed. Any issues and the patient will call or come in sooner.

## 2021-07-08 NOTE — PHYSICAL EXAM
[Normal] : normal spine exam without palpable tenderness, no kyphosis or scoliosis [de-identified] : alert and oriented x 3; no gross focal motor extremity deficits

## 2021-07-08 NOTE — PHYSICAL EXAM
[de-identified] : Right Lower Extremity\par o Ankle :\par ¦ Inspection/Palpation : no tenderness to palpation, no swelling, no deformities\par ¦ Range of Motion : arc of motion full and painless in all planes\par ¦ Stability : no joint instability on provocative testing\par ¦ Strength : all muscles 5/5\par o Muscle Bulk : no atrophy\par o Sensation : sensation intact to light touch\par o Skin : no skin lesions, no discoloration\par o Vascular Exam : no edema, no cyanosis, posterior tibialis and dorsalis pedis pulses normal \par \par o Foot:\par ¦ Inspection/Palpation : no tenderness to palpation, no swelling\par ¦ Range of Motion : arc of motion full and painless in all planes\par ¦ Stability : no joint instability on provocative testing\par ¦ Strength : all muscles 5/5\par o Muscle Bulk : no atrophy\par o Sensation : sensation intact to light touch\par o Skin : no skin lesions, no discoloration\par o Vascular Exam : no edema, no cyanosis, dorsalis pedis and posterior tibial pulses normal \par \par Left Lower Extremity\par o Ankle :\par ¦ Inspection/Palpation : no tenderness to palpation, no swelling, no deformities\par ¦ Range of Motion : arc of motion full and painless in all planes\par ¦ Stability : no joint instability on provocative testing\par ¦ Strength : all muscles 5/5\par o Muscle Bulk : no atrophy\par o Sensation : sensation intact to light touch\par o Skin : no skin lesions, no discoloration\par o Vascular Exam : no edema, no cyanosis,  posterior tibialis and dorsalis pedis pulses normal \par \par o Foot:\par ¦ Inspection/Palpation : localized tenderness to palpation over the base of the 5th metatarsal with bony prominence, mild lateral swelling\par ¦ Range of Motion : arc of motion full and painless in all planes\par ¦ Stability : no joint instability on provocative testing\par ¦ Strength : all muscles 5/5\par o Muscle Bulk : no atrophy\par o Sensation : sensation intact to light touch\par o Skin : no skin lesions, no discoloration\par o Vascular Exam : no edema, no cyanosis, dorsalis pedis and posterior tibial pulses normal \par \par   [de-identified] : Patient comes to today's visit with outside imaging already performed. I reviewed the images in detail with the patient and discussed the findings as highlighted below.\par \par o MRI of the left ankle performed on 11/02/2020 at [facility]: Impression: \par 1. No acute stress reaction or fracture.\par 2. No insertional tendinopathy of the distal peroneal peroneus brevis tendon.\par 3. Tenosynovitis of the retromalleolar peroneal tendons and medial flexor tendons.\par

## 2021-07-08 NOTE — ASSESSMENT
[FreeTextEntry1] : Lab work, Dr. Craig's 6/11/21 note reviewed.\par Gammopathy–may represent MGUS. Reviewed differential diagnosis with patient, along with potential complications if a plasma cell disorder progresses, such as MM.  Further evaluation recommended including lab work and skeletal survey.  Pending these, can decide if prudent to pursue bone marrow evaluation which was discussed with patient today-procedure explained with potential benefits/risks.\par \par Patient was given the opportunity to ask questions.  His questions have been answered to his apparent satisfaction at this time.  He expressed his understanding and willingness to comply with recommended follow-up.

## 2021-07-12 ENCOUNTER — LABORATORY RESULT (OUTPATIENT)
Age: 76
End: 2021-07-12

## 2021-07-12 ENCOUNTER — RESULT REVIEW (OUTPATIENT)
Age: 76
End: 2021-07-12

## 2021-07-12 ENCOUNTER — OUTPATIENT (OUTPATIENT)
Dept: OUTPATIENT SERVICES | Facility: HOSPITAL | Age: 76
LOS: 1 days | End: 2021-07-12
Payer: MEDICARE

## 2021-07-12 ENCOUNTER — APPOINTMENT (OUTPATIENT)
Dept: RADIOLOGY | Facility: HOSPITAL | Age: 76
End: 2021-07-12
Payer: MEDICARE

## 2021-07-12 DIAGNOSIS — Z87.898 PERSONAL HISTORY OF OTHER SPECIFIED CONDITIONS: Chronic | ICD-10-CM

## 2021-07-12 DIAGNOSIS — Z98.1 ARTHRODESIS STATUS: Chronic | ICD-10-CM

## 2021-07-12 DIAGNOSIS — Z98.89 OTHER SPECIFIED POSTPROCEDURAL STATES: Chronic | ICD-10-CM

## 2021-07-12 DIAGNOSIS — D47.2 MONOCLONAL GAMMOPATHY: ICD-10-CM

## 2021-07-12 PROCEDURE — 77074 RADEX OSSEOUS SURVEY LMTD: CPT

## 2021-07-12 PROCEDURE — 77074 RADEX OSSEOUS SURVEY LMTD: CPT | Mod: 26

## 2021-07-13 LAB
ALBUMIN SERPL ELPH-MCNC: 4.3 G/DL
ALP BLD-CCNC: 93 U/L
ALT SERPL-CCNC: 23 U/L
ANION GAP SERPL CALC-SCNC: 13 MMOL/L
AST SERPL-CCNC: 27 U/L
BASOPHILS # BLD AUTO: 0.04 K/UL
BASOPHILS NFR BLD AUTO: 0.6 %
BILIRUB SERPL-MCNC: 1 MG/DL
BUN SERPL-MCNC: 22 MG/DL
CALCIUM SERPL-MCNC: 9.8 MG/DL
CHLORIDE SERPL-SCNC: 100 MMOL/L
CO2 SERPL-SCNC: 28 MMOL/L
CREAT SERPL-MCNC: 1.25 MG/DL
DEPRECATED KAPPA LC FREE/LAMBDA SER: 1.36 RATIO
DEPRECATED KAPPA LC FREE/LAMBDA SER: 1.36 RATIO
EOSINOPHIL # BLD AUTO: 0.17 K/UL
EOSINOPHIL NFR BLD AUTO: 2.7 %
FOLATE SERPL-MCNC: >20 NG/ML
GLUCOSE SERPL-MCNC: 145 MG/DL
HAPTOGLOB SERPL-MCNC: 188 MG/DL
HCT VFR BLD CALC: 41.2 %
HGB BLD-MCNC: 14 G/DL
IGA SER QL IEP: 316 MG/DL
IGG SER QL IEP: 744 MG/DL
IGM SER QL IEP: 111 MG/DL
IMM GRANULOCYTES NFR BLD AUTO: 0.5 %
KAPPA LC CSF-MCNC: 1.44 MG/DL
KAPPA LC CSF-MCNC: 1.44 MG/DL
KAPPA LC SERPL-MCNC: 1.96 MG/DL
KAPPA LC SERPL-MCNC: 1.96 MG/DL
LDH SERPL-CCNC: 199 U/L
LYMPHOCYTES # BLD AUTO: 1.41 K/UL
LYMPHOCYTES NFR BLD AUTO: 22 %
MAN DIFF?: NORMAL
MCHC RBC-ENTMCNC: 34 GM/DL
MCHC RBC-ENTMCNC: 34.7 PG
MCV RBC AUTO: 102.2 FL
MONOCYTES # BLD AUTO: 0.73 K/UL
MONOCYTES NFR BLD AUTO: 11.4 %
NEUTROPHILS # BLD AUTO: 4.02 K/UL
NEUTROPHILS NFR BLD AUTO: 62.8 %
PLATELET # BLD AUTO: 220 K/UL
POTASSIUM SERPL-SCNC: 4.8 MMOL/L
PROT SERPL-MCNC: 6.7 G/DL
RBC # BLD: 4.03 M/UL
RBC # BLD: 4.03 M/UL
RBC # FLD: 13.1 %
RETICS # AUTO: 1.8 %
RETICS AGGREG/RBC NFR: 70.5 K/UL
SODIUM SERPL-SCNC: 141 MMOL/L
VIT B12 SERPL-MCNC: 501 PG/ML
WBC # FLD AUTO: 6.4 K/UL

## 2021-07-14 LAB
ALBUPE: 18.9 %
ALPHA1UPE: 38 %
ALPHA2UPE: 14.3 %
BETAUPE: 17.1 %
CREAT 24H UR-MCNC: NORMAL G/24 H
CREATININE UR (MAYO): 96 MG/DL
GAMMAUPE: 11.7 %
IGA 24H UR QL IFE: NORMAL
KAPPA LC 24H UR QL: NORMAL
PROT PATTERN 24H UR ELPH-IMP: NORMAL
PROT UR-MCNC: 9 MG/DL
PROT UR-MCNC: 9 MG/DL
SPECIMEN VOL 24H UR: NORMAL ML

## 2021-07-19 LAB
ALBUMIN MFR SERPL ELPH: 59.1 %
ALBUMIN SERPL-MCNC: 4 G/DL
ALBUMIN/GLOB SERPL: 1.5 RATIO
ALPHA1 GLOB MFR SERPL ELPH: 3.4 %
ALPHA1 GLOB SERPL ELPH-MCNC: 0.2 G/DL
ALPHA2 GLOB MFR SERPL ELPH: 12.5 %
ALPHA2 GLOB SERPL ELPH-MCNC: 0.8 G/DL
B-GLOBULIN MFR SERPL ELPH: 11.8 %
B-GLOBULIN SERPL ELPH-MCNC: 0.8 G/DL
GAMMA GLOB FLD ELPH-MCNC: 0.9 G/DL
GAMMA GLOB MFR SERPL ELPH: 13.2 %
INTERPRETATION SERPL IEP-IMP: NORMAL
M PROTEIN MFR SERPL ELPH: NORMAL
M PROTEIN SPEC IFE-MCNC: NORMAL
MONOCLON BAND OBS SERPL: NORMAL
PROT SERPL-MCNC: 6.7 G/DL
PROT SERPL-MCNC: 6.7 G/DL

## 2021-08-05 ENCOUNTER — APPOINTMENT (OUTPATIENT)
Dept: HEMATOLOGY ONCOLOGY | Facility: CLINIC | Age: 76
End: 2021-08-05
Payer: MEDICARE

## 2021-08-05 VITALS
TEMPERATURE: 96.8 F | RESPIRATION RATE: 17 BRPM | HEIGHT: 68 IN | WEIGHT: 209.99 LBS | HEART RATE: 60 BPM | BODY MASS INDEX: 31.83 KG/M2 | DIASTOLIC BLOOD PRESSURE: 74 MMHG | OXYGEN SATURATION: 98 % | SYSTOLIC BLOOD PRESSURE: 134 MMHG

## 2021-08-05 PROCEDURE — 99213 OFFICE O/P EST LOW 20 MIN: CPT

## 2021-08-05 NOTE — PHYSICAL EXAM
[Normal] : normal appearance, no rash, nodules, vesicles, ulcers, erythema [de-identified] : breathing appeared unlabored [de-identified] : A &O x 3

## 2021-08-05 NOTE — ASSESSMENT
[FreeTextEntry1] : Lab work, skeletal survey results reviewed with patient.\par Gammopathy–may represent MGUS. Reviewed differential diagnosis with patient, along with potential complications if a plasma cell disorder progresses, such as MM.  With IgA gammopathy, feel it prudent to pursue bone marrow evaluation-procedure explained with potential benefits/risks. Patient consents to BM aspiration/biopsy.\par \par Patient was given the opportunity to ask questions.  His questions have been answered to his apparent satisfaction at this time.  He expressed his understanding and willingness to comply with recommended follow-up.

## 2021-08-05 NOTE — HISTORY OF PRESENT ILLNESS
[de-identified] : 7/2021-Patient presented at the request of his neurologist for an abnormal serum immunofixation revealing 2-weak IgA kappa bands.  He had been undergoing a neurologic evaluation for a 6-month history of imbalance and unsteady gait.  Dr. Craig felt his neurological exam remained consistent with spinal deformity having had prior surgery and peripheral neuropathy. Suspected a multifactorial gait disorder.\par \par Got Covid vaccines (Moderna).\par \par  [de-identified] : No current pulmonary, GI/ complaints. No fevers. No h/o bleeding. No LN complaints.\par Plans to see chiropractor for back muscle spasms.

## 2021-08-05 NOTE — CONSULT LETTER
[Dear  ___] : Dear  [unfilled], [Courtesy Letter:] : I had the pleasure of seeing your patient, [unfilled], in my office today. [Please see my note below.] : Please see my note below. [Consult Closing:] : Thank you very much for allowing me to participate in the care of this patient.  If you have any questions, please do not hesitate to contact me. [Sincerely,] : Sincerely, [FreeTextEntry3] : Mansi Cesar MD

## 2021-08-13 ENCOUNTER — OUTPATIENT (OUTPATIENT)
Dept: OUTPATIENT SERVICES | Facility: HOSPITAL | Age: 76
LOS: 1 days | Discharge: HOME | End: 2021-08-13

## 2021-08-13 DIAGNOSIS — D64.9 ANEMIA, UNSPECIFIED: ICD-10-CM

## 2021-08-13 DIAGNOSIS — Z98.1 ARTHRODESIS STATUS: Chronic | ICD-10-CM

## 2021-08-13 DIAGNOSIS — Z87.898 PERSONAL HISTORY OF OTHER SPECIFIED CONDITIONS: Chronic | ICD-10-CM

## 2021-08-13 DIAGNOSIS — Z98.89 OTHER SPECIFIED POSTPROCEDURAL STATES: Chronic | ICD-10-CM

## 2021-08-17 ENCOUNTER — LABORATORY RESULT (OUTPATIENT)
Age: 76
End: 2021-08-17

## 2021-08-17 ENCOUNTER — RESULT REVIEW (OUTPATIENT)
Age: 76
End: 2021-08-17

## 2021-08-17 ENCOUNTER — APPOINTMENT (OUTPATIENT)
Dept: HEMATOLOGY ONCOLOGY | Facility: CLINIC | Age: 76
End: 2021-08-17
Payer: MEDICARE

## 2021-08-17 VITALS
SYSTOLIC BLOOD PRESSURE: 186 MMHG | RESPIRATION RATE: 17 BRPM | WEIGHT: 215.17 LBS | TEMPERATURE: 98.4 F | DIASTOLIC BLOOD PRESSURE: 83 MMHG | OXYGEN SATURATION: 98 % | BODY MASS INDEX: 32.61 KG/M2 | HEIGHT: 68 IN | HEART RATE: 68 BPM

## 2021-08-17 LAB
BASOPHILS # BLD AUTO: 0.04 K/UL — SIGNIFICANT CHANGE UP (ref 0–0.2)
BASOPHILS NFR BLD AUTO: 0.6 % — SIGNIFICANT CHANGE UP (ref 0–2)
EOSINOPHIL # BLD AUTO: 0.19 K/UL — SIGNIFICANT CHANGE UP (ref 0–0.5)
EOSINOPHIL NFR BLD AUTO: 3 % — SIGNIFICANT CHANGE UP (ref 0–6)
HCT VFR BLD CALC: 40.9 % — SIGNIFICANT CHANGE UP (ref 39–50)
HGB BLD-MCNC: 14.3 G/DL — SIGNIFICANT CHANGE UP (ref 13–17)
IMM GRANULOCYTES NFR BLD AUTO: 1.3 % — SIGNIFICANT CHANGE UP (ref 0–1.5)
LYMPHOCYTES # BLD AUTO: 1.17 K/UL — SIGNIFICANT CHANGE UP (ref 1–3.3)
LYMPHOCYTES # BLD AUTO: 18.4 % — SIGNIFICANT CHANGE UP (ref 13–44)
MCHC RBC-ENTMCNC: 34.1 PG — HIGH (ref 27–34)
MCHC RBC-ENTMCNC: 35 G/DL — SIGNIFICANT CHANGE UP (ref 32–36)
MCV RBC AUTO: 97.6 FL — SIGNIFICANT CHANGE UP (ref 80–100)
MONOCYTES # BLD AUTO: 0.67 K/UL — SIGNIFICANT CHANGE UP (ref 0–0.9)
MONOCYTES NFR BLD AUTO: 10.5 % — SIGNIFICANT CHANGE UP (ref 2–14)
NEUTROPHILS # BLD AUTO: 4.22 K/UL — SIGNIFICANT CHANGE UP (ref 1.8–7.4)
NEUTROPHILS NFR BLD AUTO: 66.2 % — SIGNIFICANT CHANGE UP (ref 43–77)
NRBC # BLD: 0 /100 WBCS — SIGNIFICANT CHANGE UP (ref 0–0)
PLATELET # BLD AUTO: 191 K/UL — SIGNIFICANT CHANGE UP (ref 150–400)
RBC # BLD: 4.19 M/UL — LOW (ref 4.2–5.8)
RBC # FLD: 12.7 % — SIGNIFICANT CHANGE UP (ref 10.3–14.5)
WBC # BLD: 6.37 K/UL — SIGNIFICANT CHANGE UP (ref 3.8–10.5)
WBC # FLD AUTO: 6.37 K/UL — SIGNIFICANT CHANGE UP (ref 3.8–10.5)

## 2021-08-17 PROCEDURE — 38222 DX BONE MARROW BX & ASPIR: CPT | Mod: RT

## 2021-08-17 NOTE — REASON FOR VISIT
[Bone Marrow Biopsy] : bone marrow biopsy [Bone Marrow Aspiration] : bone marrow aspiration [FreeTextEntry2] : 76 yo M with abnormal serum immunofixation revealing 2-weak IgA kappa bands R/O MM

## 2021-08-17 NOTE — PROCEDURE
[Bone Marrow Biopsy] : bone marrow biopsy [Bone Marrow Aspiration] : bone marrow aspiration  [Patient] : the patient [Verbal Consent Obtained] : verbal consent was obtained prior to the procedure [Patient identification verified] : patient identification verified [Procedure verified and consent obtained] : procedure verified and consent obtained [Laterality verified and correct site marked] : laterality verified and correct site marked [Correct positioning] : correct positioning [Correct implant and/ or special equipment obtained] : correct impact and/ or special equipment obtained [Prone] : prone [Superior iliac spine was identified] : the superior iliac spine was identified. [Lidocaine was injected and into the periosteum overlying the site.] : Lidocaine was injected and into the periosteum overlying the site. [Aspirate] : aspirate [Cytogenetics] : cytogenetics [FISH] : FISH [Biopsy] : biopsy [Flow Cytometry] : flow cytometry [] : The patient was instructed to remove the bandage the following AM. The patient may bathe. Acetaminophen may be taken for discomfort, as per package directions.If there are any other problems, the patient was instructed to call the office. The patient verbalized understanding, and is aware of the office contact numbers. [Right] : site: right [The right posterior iliac crest was prepped with betadine and draped, using sterile technique.] : The right posterior iliac crest was prepped with betadine and draped, using sterile technique. [FreeTextEntry1] : 74 yo M with abnormal serum immunofixation revealing 2-weak IgA kappa bands R/O MM [FreeTextEntry2] : WBC: 6.37\par Hgb: 14.3\par PLT: 191K\par \par Bone marrow biopsy and aspiration completed. Patient tolerated procedure well.

## 2021-08-18 ENCOUNTER — LABORATORY RESULT (OUTPATIENT)
Age: 76
End: 2021-08-18

## 2021-08-30 ENCOUNTER — APPOINTMENT (OUTPATIENT)
Dept: HEMATOLOGY ONCOLOGY | Facility: CLINIC | Age: 76
End: 2021-08-30
Payer: MEDICARE

## 2021-09-08 ENCOUNTER — APPOINTMENT (OUTPATIENT)
Dept: HEMATOLOGY ONCOLOGY | Facility: CLINIC | Age: 76
End: 2021-09-08
Payer: MEDICARE

## 2021-09-08 VITALS
HEART RATE: 61 BPM | TEMPERATURE: 98.1 F | WEIGHT: 216.05 LBS | SYSTOLIC BLOOD PRESSURE: 124 MMHG | DIASTOLIC BLOOD PRESSURE: 69 MMHG | BODY MASS INDEX: 34.72 KG/M2 | HEIGHT: 66.18 IN | RESPIRATION RATE: 17 BRPM | OXYGEN SATURATION: 97 %

## 2021-09-08 PROCEDURE — 99213 OFFICE O/P EST LOW 20 MIN: CPT

## 2021-09-08 NOTE — CONSULT LETTER
[Dear  ___] : Dear  [unfilled], [Courtesy Letter:] : I had the pleasure of seeing your patient, [unfilled], in my office today. [Please see my note below.] : Please see my note below. [Consult Closing:] : Thank you very much for allowing me to participate in the care of this patient.  If you have any questions, please do not hesitate to contact me. [Sincerely,] : Sincerely, [DrBridgett  ___] : Dr. LAST [FreeTextEntry3] : Mansi Cesar MD

## 2021-09-08 NOTE — HISTORY OF PRESENT ILLNESS
[de-identified] : 7/2021-Patient presented at the request of his neurologist for an abnormal serum immunofixation revealing 2-weak IgA kappa bands.  He had been undergoing a neurologic evaluation for a 6-month history of imbalance and unsteady gait.  Dr. Craig felt his neurological exam remained consistent with spinal deformity having had prior surgery and peripheral neuropathy. Suspected a multifactorial gait disorder.\par \par 8/17/21-Bone marrow biopsy and bone marrow aspirate showed monotypic plasma cells involving 2-3% of cellularity.  Cytogenetics with normal male karyotype.  Normal myeloma FISH panel.\par \par Got Covid vaccines (Moderna).\par \par  [de-identified] : S/P BMB.\par Feels well.\par No current pulmonary, GI/ complaints. No fevers. No h/o bleeding. No LN complaints.\par

## 2021-09-08 NOTE — PHYSICAL EXAM
[Normal] : affect appropriate [de-identified] : breathing appeared unlabored [de-identified] : healed BMB site. [de-identified] : A &O x 3

## 2021-09-08 NOTE — ASSESSMENT
[FreeTextEntry1] : Bone marrow pathology, flow cytometry, cytogenetics, FISH results reviewed with patient.\par IgA Gammopathy–currently c/w MGUS. Reviewed diagnosis, along with potential complications if a plasma cell disorder progresses, such as MM.  Plan hematologic surveillance at this time.\par \par Patient was given the opportunity to ask questions.  His questions have been answered to his apparent satisfaction.  He expressed his understanding and willingness to comply with recommended follow-up.

## 2022-03-15 ENCOUNTER — APPOINTMENT (OUTPATIENT)
Dept: MRI IMAGING | Facility: HOSPITAL | Age: 77
End: 2022-03-15
Payer: MEDICARE

## 2022-03-15 ENCOUNTER — OUTPATIENT (OUTPATIENT)
Dept: OUTPATIENT SERVICES | Facility: HOSPITAL | Age: 77
LOS: 1 days | End: 2022-03-15
Payer: MEDICARE

## 2022-03-15 DIAGNOSIS — Z98.89 OTHER SPECIFIED POSTPROCEDURAL STATES: Chronic | ICD-10-CM

## 2022-03-15 DIAGNOSIS — Z98.1 ARTHRODESIS STATUS: Chronic | ICD-10-CM

## 2022-03-15 DIAGNOSIS — Z00.8 ENCOUNTER FOR OTHER GENERAL EXAMINATION: ICD-10-CM

## 2022-03-15 DIAGNOSIS — Z87.898 PERSONAL HISTORY OF OTHER SPECIFIED CONDITIONS: Chronic | ICD-10-CM

## 2022-03-15 PROCEDURE — 73221 MRI JOINT UPR EXTREM W/O DYE: CPT | Mod: 26,RT,MH

## 2022-03-15 PROCEDURE — 73221 MRI JOINT UPR EXTREM W/O DYE: CPT | Mod: MH

## 2022-03-24 ENCOUNTER — LABORATORY RESULT (OUTPATIENT)
Age: 77
End: 2022-03-24

## 2022-03-24 LAB
BASOPHILS # BLD AUTO: 0.03 K/UL
BASOPHILS NFR BLD AUTO: 0.4 %
CALCIUM SERPL-MCNC: 9.7 MG/DL
CREAT SERPL-MCNC: 1.39 MG/DL
DEPRECATED KAPPA LC FREE/LAMBDA SER: 1.22 RATIO
DEPRECATED KAPPA LC FREE/LAMBDA SER: 1.22 RATIO
EGFR: 53 ML/MIN/1.73M2
EOSINOPHIL # BLD AUTO: 0.13 K/UL
EOSINOPHIL NFR BLD AUTO: 1.9 %
HCT VFR BLD CALC: 40.8 %
HGB BLD-MCNC: 13.6 G/DL
IGA SER QL IEP: 320 MG/DL
IGG SER QL IEP: 696 MG/DL
IGM SER QL IEP: 97 MG/DL
IMM GRANULOCYTES NFR BLD AUTO: 0.7 %
KAPPA LC CSF-MCNC: 1.93 MG/DL
KAPPA LC CSF-MCNC: 1.93 MG/DL
KAPPA LC SERPL-MCNC: 2.35 MG/DL
KAPPA LC SERPL-MCNC: 2.35 MG/DL
LDH SERPL-CCNC: 224 U/L
LYMPHOCYTES # BLD AUTO: 1.39 K/UL
LYMPHOCYTES NFR BLD AUTO: 20.4 %
MAN DIFF?: NORMAL
MCHC RBC-ENTMCNC: 33.3 GM/DL
MCHC RBC-ENTMCNC: 34 PG
MCV RBC AUTO: 102 FL
MONOCYTES # BLD AUTO: 0.69 K/UL
MONOCYTES NFR BLD AUTO: 10.1 %
NEUTROPHILS # BLD AUTO: 4.52 K/UL
NEUTROPHILS NFR BLD AUTO: 66.5 %
PLATELET # BLD AUTO: 225 K/UL
RBC # BLD: 4 M/UL
RBC # FLD: 14.7 %
WBC # FLD AUTO: 6.81 K/UL

## 2022-03-25 ENCOUNTER — NON-APPOINTMENT (OUTPATIENT)
Age: 77
End: 2022-03-25

## 2022-04-01 LAB
ALBUMIN MFR SERPL ELPH: 57.7 %
ALBUMIN SERPL-MCNC: 4.2 G/DL
ALBUMIN/GLOB SERPL: 1.4 RATIO
ALPHA1 GLOB MFR SERPL ELPH: 4 %
ALPHA1 GLOB SERPL ELPH-MCNC: 0.3 G/DL
ALPHA2 GLOB MFR SERPL ELPH: 14.6 %
ALPHA2 GLOB SERPL ELPH-MCNC: 1.1 G/DL
B-GLOBULIN MFR SERPL ELPH: 11.7 %
B-GLOBULIN SERPL ELPH-MCNC: 0.9 G/DL
GAMMA GLOB FLD ELPH-MCNC: 0.9 G/DL
GAMMA GLOB MFR SERPL ELPH: 12 %
INTERPRETATION SERPL IEP-IMP: NORMAL
M PROTEIN MFR SERPL ELPH: 1.9 %
MONOCLON BAND OBS SERPL: 0.1 G/DL
PROT SERPL-MCNC: 7.3 G/DL
PROT SERPL-MCNC: 7.3 G/DL

## 2022-04-04 ENCOUNTER — OUTPATIENT (OUTPATIENT)
Dept: OUTPATIENT SERVICES | Facility: HOSPITAL | Age: 77
LOS: 1 days | Discharge: HOME | End: 2022-04-04

## 2022-04-04 DIAGNOSIS — Z98.89 OTHER SPECIFIED POSTPROCEDURAL STATES: Chronic | ICD-10-CM

## 2022-04-04 DIAGNOSIS — Z87.898 PERSONAL HISTORY OF OTHER SPECIFIED CONDITIONS: Chronic | ICD-10-CM

## 2022-04-04 DIAGNOSIS — Z98.1 ARTHRODESIS STATUS: Chronic | ICD-10-CM

## 2022-04-04 DIAGNOSIS — D64.9 ANEMIA, UNSPECIFIED: ICD-10-CM

## 2022-04-07 ENCOUNTER — APPOINTMENT (OUTPATIENT)
Dept: HEMATOLOGY ONCOLOGY | Facility: CLINIC | Age: 77
End: 2022-04-07
Payer: MEDICARE

## 2022-04-07 VITALS
SYSTOLIC BLOOD PRESSURE: 115 MMHG | OXYGEN SATURATION: 98 % | HEIGHT: 67.8 IN | DIASTOLIC BLOOD PRESSURE: 68 MMHG | HEART RATE: 64 BPM | BODY MASS INDEX: 32.69 KG/M2 | TEMPERATURE: 97.4 F | RESPIRATION RATE: 16 BRPM | WEIGHT: 213.19 LBS

## 2022-04-07 PROCEDURE — 99213 OFFICE O/P EST LOW 20 MIN: CPT

## 2022-04-07 NOTE — ASSESSMENT
[FreeTextEntry1] : Lab work reviewed with patient.\par IgA Gammopathy–currently c/w MGUS. Reviewed potential complications if a plasma cell disorder progresses. Clinically stable at present. Plan hematologic surveillance at this time.\par \par Increased creatinine noted-patient to increase PO fluid hydration as tolerated, and advised to f/u with PCP regarding interval f/u labs, (?med adjustment).\par \par Patient was given the opportunity to ask questions.  His questions have been answered to his apparent satisfaction.  He expressed his understanding and willingness to comply with recommended follow-up.

## 2022-04-07 NOTE — PHYSICAL EXAM
[Normal] : affect appropriate [de-identified] : breathing appeared unlabored [de-identified] : A &O x 3

## 2022-04-07 NOTE — HISTORY OF PRESENT ILLNESS
[de-identified] : 7/2021-Patient presented at the request of his neurologist for an abnormal serum immunofixation revealing 2-weak IgA kappa bands.  He had been undergoing a neurologic evaluation for a 6-month history of imbalance and unsteady gait.  Dr. Craig felt his neurological exam remained consistent with spinal deformity having had prior surgery and peripheral neuropathy. Suspected a multifactorial gait disorder.\par \par 8/17/21-Bone marrow biopsy and bone marrow aspirate showed monotypic plasma cells involving 2-3% of cellularity.  Cytogenetics with normal male karyotype.  Normal myeloma FISH panel.\par \par Got Covid vaccines (Moderna).\par \par  [de-identified] : No current pulmonary, GI/ complaints. No fevers. No h/o bleeding. No LN complaints.\par Donated blood 4/5/22, though was unable to donate platelets.\par [facility].\par \par Had COVID vaccines (Moderna) x 4.

## 2022-07-28 ENCOUNTER — APPOINTMENT (OUTPATIENT)
Dept: NEUROLOGY | Facility: CLINIC | Age: 77
End: 2022-07-28

## 2022-07-28 VITALS
BODY MASS INDEX: 32.96 KG/M2 | DIASTOLIC BLOOD PRESSURE: 74 MMHG | HEART RATE: 84 BPM | SYSTOLIC BLOOD PRESSURE: 122 MMHG | HEIGHT: 67 IN | WEIGHT: 210 LBS

## 2022-07-28 DIAGNOSIS — G63 MONOCLONAL GAMMOPATHY: ICD-10-CM

## 2022-07-28 DIAGNOSIS — D47.2 MONOCLONAL GAMMOPATHY: ICD-10-CM

## 2022-07-28 DIAGNOSIS — R27.0 ATAXIA, UNSPECIFIED: ICD-10-CM

## 2022-07-28 PROCEDURE — 99213 OFFICE O/P EST LOW 20 MIN: CPT

## 2022-07-28 NOTE — PHYSICAL EXAM
[FreeTextEntry1] : Head:  Normocephalic Neck: Supple nontender no carotid bruits.  Spine:  Nontender extensive thoracolumbar scar.  Decreased forward bending.  Negative bilateral straight leg raising.\par \par Mental Status:  Alert Oriented X3 Speech normal and no aphasia or dysarthria.\par \par Cranial Nerves:  PERRL, Visual Fields full  EOMI no diplopia no ptosis no nystagmus, V through XII intact.\par \par Motor:  No drift, normal strength tone and coordination and no focal atrophy. No abnormal movements. No dysmetria.  Normal rapid alternating movements. \par \par DTRs: Symmetric absent in the lowers.  Plantars flexor.  No Clonus.\par \par Sensory: Reduced pinprick vibration distally in the lowers left greater than right.\par \par Gait: Stooped posture unsteady gait.  Spinal deformity.\par

## 2022-07-28 NOTE — REASON FOR VISIT
[Follow-Up: _____] : a [unfilled] follow-up visit [FreeTextEntry1] : Unsteady gait peripheral neuropathy lumbar radiculopathy

## 2022-07-28 NOTE — HISTORY OF PRESENT ILLNESS
[FreeTextEntry1] : This patient is seen for an office visit.  He has a chronic unsteady gait.  He is also having significant lower back pain with radiation to the lower extremities.  He is status post extensive thoracolumbar spinal fusion and spinal stenosis at the T10-11 level.  He has seen pain management.  He is asking about obtaining medical marijuana to help with pain control.  Blood tests did reveal MGUS and is following with hematology.

## 2022-07-28 NOTE — ASSESSMENT
[FreeTextEntry1] : Impression: This 76-year-old male patient with diabetes has history of an unsteady gait.  Work-up did reveal gamma migrating paraprotein consistent with MGUS.  He is status post thoracolumbar spinal surgery for T10-T11 spinal stenosis.  He probably has a multifactorial gait disorder including prior spinal surgery peripheral neuropathy due to MGUS and diabetes and lumbar radiculopathy.\par \par Recommendations: MRI opinion this patient is a candidate for medical marijuana.  He would not require further work-up from the neurological standpoint noted to receive this medication.  He has multiple reasons to have a chronic pain syndrome due to his thoracolumbar spine condition status post extensive surgery and multifactorial peripheral neuropathy.\par

## 2022-09-29 ENCOUNTER — NON-APPOINTMENT (OUTPATIENT)
Age: 77
End: 2022-09-29

## 2022-09-30 LAB
BASOPHILS # BLD AUTO: 0.04 K/UL
BASOPHILS NFR BLD AUTO: 0.6 %
CALCIUM SERPL-MCNC: 10.5 MG/DL
CREAT SERPL-MCNC: 1.3 MG/DL
DEPRECATED KAPPA LC FREE/LAMBDA SER: 1.06 RATIO
EGFR: 57 ML/MIN/1.73M2
EOSINOPHIL # BLD AUTO: 0.28 K/UL
EOSINOPHIL NFR BLD AUTO: 4.3 %
HCT VFR BLD CALC: 42.4 %
HGB BLD-MCNC: 14.4 G/DL
IGA SER QL IEP: 334 MG/DL
IGG SER QL IEP: 740 MG/DL
IGM SER QL IEP: 110 MG/DL
IMM GRANULOCYTES NFR BLD AUTO: 0.5 %
KAPPA LC CSF-MCNC: 2.32 MG/DL
KAPPA LC SERPL-MCNC: 2.46 MG/DL
LYMPHOCYTES # BLD AUTO: 1.27 K/UL
LYMPHOCYTES NFR BLD AUTO: 19.6 %
MAN DIFF?: NORMAL
MCHC RBC-ENTMCNC: 34 GM/DL
MCHC RBC-ENTMCNC: 35.3 PG
MCV RBC AUTO: 103.9 FL
MONOCYTES # BLD AUTO: 0.79 K/UL
MONOCYTES NFR BLD AUTO: 12.2 %
NEUTROPHILS # BLD AUTO: 4.08 K/UL
NEUTROPHILS NFR BLD AUTO: 62.8 %
PLATELET # BLD AUTO: 195 K/UL
RBC # BLD: 4.08 M/UL
RBC # FLD: 14.3 %
WBC # FLD AUTO: 6.49 K/UL

## 2022-10-06 ENCOUNTER — OUTPATIENT (OUTPATIENT)
Dept: OUTPATIENT SERVICES | Facility: HOSPITAL | Age: 77
LOS: 1 days | Discharge: HOME | End: 2022-10-06

## 2022-10-06 DIAGNOSIS — Z98.1 ARTHRODESIS STATUS: Chronic | ICD-10-CM

## 2022-10-06 DIAGNOSIS — Z87.898 PERSONAL HISTORY OF OTHER SPECIFIED CONDITIONS: Chronic | ICD-10-CM

## 2022-10-06 DIAGNOSIS — Z98.89 OTHER SPECIFIED POSTPROCEDURAL STATES: Chronic | ICD-10-CM

## 2022-10-06 DIAGNOSIS — D64.9 ANEMIA, UNSPECIFIED: ICD-10-CM

## 2022-10-07 LAB
ALBUMIN MFR SERPL ELPH: 59.8 %
ALBUMIN SERPL-MCNC: 4.1 G/DL
ALBUMIN/GLOB SERPL: 1.5 RATIO
ALPHA1 GLOB MFR SERPL ELPH: 3.6 %
ALPHA1 GLOB SERPL ELPH-MCNC: 0.2 G/DL
ALPHA2 GLOB MFR SERPL ELPH: 13 %
ALPHA2 GLOB SERPL ELPH-MCNC: 0.9 G/DL
B-GLOBULIN MFR SERPL ELPH: 13.6 %
B-GLOBULIN SERPL ELPH-MCNC: 0.9 G/DL
GAMMA GLOB FLD ELPH-MCNC: 0.7 G/DL
GAMMA GLOB MFR SERPL ELPH: 10 %
INTERPRETATION SERPL IEP-IMP: NORMAL
M PROTEIN MFR SERPL ELPH: NORMAL
MONOCLON BAND OBS SERPL: NORMAL
PROT SERPL-MCNC: 6.9 G/DL
PROT SERPL-MCNC: 6.9 G/DL

## 2022-10-10 ENCOUNTER — APPOINTMENT (OUTPATIENT)
Dept: HEMATOLOGY ONCOLOGY | Facility: CLINIC | Age: 77
End: 2022-10-10

## 2022-10-10 VITALS
SYSTOLIC BLOOD PRESSURE: 129 MMHG | TEMPERATURE: 97.4 F | RESPIRATION RATE: 16 BRPM | HEIGHT: 67.01 IN | HEART RATE: 56 BPM | DIASTOLIC BLOOD PRESSURE: 75 MMHG | WEIGHT: 220.46 LBS | BODY MASS INDEX: 34.6 KG/M2 | OXYGEN SATURATION: 98 %

## 2022-10-10 PROCEDURE — 99213 OFFICE O/P EST LOW 20 MIN: CPT

## 2022-10-10 NOTE — ASSESSMENT
[FreeTextEntry1] : Lab work reviewed with patient.\par IgA Gammopathy–currently c/w MGUS. Reviewed potential complications if a plasma cell disorder progresses. Clinically stable at present. Hematologic surveillance.\par \par Patient was given the opportunity to ask questions.  His questions have been answered to his apparent satisfaction.  He expressed his understanding and willingness to comply with recommended follow-up.

## 2022-10-10 NOTE — PHYSICAL EXAM
[Normal] : affect appropriate [de-identified] : breathing appeared unlabored [de-identified] : A &O x 3

## 2022-10-10 NOTE — HISTORY OF PRESENT ILLNESS
[de-identified] : 7/2021-Patient presented at the request of his neurologist for an abnormal serum immunofixation revealing 2-weak IgA kappa bands.  He had been undergoing a neurologic evaluation for a 6-month history of imbalance and unsteady gait.  Dr. Craig felt his neurological exam remained consistent with spinal deformity having had prior surgery and peripheral neuropathy. Suspected a multifactorial gait disorder.\par \par 8/17/21-Bone marrow biopsy and bone marrow aspirate showed monotypic plasma cells involving 2-3% of cellularity.  Cytogenetics with normal male karyotype.  Normal myeloma FISH panel.\par \par Has had Covid vaccines (Moderna).\par \par  [de-identified] : Overall, feeling well.\par No current pulmonary, GI/ complaints. No fevers. No h/o bleeding. No LN complaints.\par Donated blood 4/5/22, though was unable to donate platelets.\par [facility].\par \par Has had COVID vaccines (Moderna) x 4.

## 2022-11-03 ENCOUNTER — NON-APPOINTMENT (OUTPATIENT)
Age: 77
End: 2022-11-03

## 2022-11-03 RX ORDER — ALLOPURINOL 300 MG/1
300 TABLET ORAL DAILY
Refills: 0 | Status: ACTIVE | COMMUNITY

## 2022-11-03 RX ORDER — METFORMIN HYDROCHLORIDE 500 MG/1
500 TABLET, COATED ORAL 4 TIMES DAILY
Refills: 0 | Status: ACTIVE | COMMUNITY

## 2022-11-03 RX ORDER — ATORVASTATIN CALCIUM 80 MG/1
80 TABLET, FILM COATED ORAL DAILY
Refills: 0 | Status: ACTIVE | COMMUNITY

## 2022-11-07 ENCOUNTER — APPOINTMENT (OUTPATIENT)
Dept: ORTHOPEDIC SURGERY | Facility: CLINIC | Age: 77
End: 2022-11-07

## 2022-11-07 VITALS — BODY MASS INDEX: 32.58 KG/M2 | HEIGHT: 68 IN | WEIGHT: 215 LBS

## 2022-11-07 PROCEDURE — 99213 OFFICE O/P EST LOW 20 MIN: CPT | Mod: 25

## 2022-11-07 PROCEDURE — J3490M: CUSTOM

## 2022-11-07 PROCEDURE — 20610 DRAIN/INJ JOINT/BURSA W/O US: CPT | Mod: RT

## 2022-11-07 NOTE — ASSESSMENT
[FreeTextEntry1] : Patient comes in today for follow-up on his right shoulder.  The cortisone shot I gave him last time only worked for a few weeks.  The pain is now come back.  He has pain reaching overhead straightahead and behind.  He has pain at rest and at night.  He gets crepitation in his shoulder and has difficulty with activities of daily living.

## 2022-11-07 NOTE — PHYSICAL EXAM
[de-identified] : Examination of right upper extremity feels normal neurovascular exam.  Examination of the right shoulder reveals forward elevation to 130 external rotation 30 internal rotation to L1 with 5 out of 5 strength of deltoid and rotator cuff.  There is pain at the extremes of motion.  There is mild crepitation.  There is no instability apprehension.  There is no redness rashes or visible scars and no point tenderness.

## 2022-11-07 NOTE — HISTORY OF PRESENT ILLNESS
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

## 2022-11-07 NOTE — DISCUSSION/SUMMARY
[de-identified] : Plan at this point is activity modification.  We had a long discussion about the risk benefits and alternatives as well as the recovery from a right total shoulder replacement.  I think this ultimately will be his best course of action but he is not interested at this time.  I will see him back as needed.  We will consider another cortisone shot or possibly surgery in the future.

## 2022-11-07 NOTE — PROCEDURE
[FreeTextEntry3] : Under sterile conditions the right shoulder was injected intra-articularly with 5 cc of quarter percent plain Marcaine 5 cc of 2% plain lidocaine and 80 mg of Depo-Medrol.  The patient tolerated the procedure well.

## 2022-11-14 ENCOUNTER — APPOINTMENT (OUTPATIENT)
Dept: NEUROLOGY | Facility: CLINIC | Age: 77
End: 2022-11-14

## 2022-11-14 VITALS
SYSTOLIC BLOOD PRESSURE: 128 MMHG | HEART RATE: 62 BPM | WEIGHT: 218 LBS | BODY MASS INDEX: 33.04 KG/M2 | HEIGHT: 68 IN | DIASTOLIC BLOOD PRESSURE: 82 MMHG

## 2022-11-14 DIAGNOSIS — R51.9 HEADACHE, UNSPECIFIED: ICD-10-CM

## 2022-11-14 DIAGNOSIS — R26.89 OTHER ABNORMALITIES OF GAIT AND MOBILITY: ICD-10-CM

## 2022-11-14 DIAGNOSIS — E11.42 TYPE 2 DIABETES MELLITUS WITH DIABETIC POLYNEUROPATHY: ICD-10-CM

## 2022-11-14 PROCEDURE — 99215 OFFICE O/P EST HI 40 MIN: CPT

## 2022-11-14 NOTE — ASSESSMENT
[FreeTextEntry1] : Impression: This 77-year-old male patient has a 3 to 4-month history of recurrent stereotyped left frontal short duration less than 1 minute sharp headache.  In addition he describes a left foot slap which is also chronic in the setting of a chronic gait disorder with history of a lumbar radiculopathy spinal stenosis thoracolumbar laminectomies peripheral neuropathy in the setting of diabetes and MGUS.  Today's exam did not document a left foot drop.  The cause of the left frontal recurrent headache is unclear.  Rule migraine variant.  Rule out one of the paroxysmal hemicranial variants.  Atypical for temporal arteritis.  Atypical for intracranial structural or vascular pathology\par \par Recommendations: MRI of the brain with and without contrast.  Trial of naproxen 500 mg twice daily for several days and then as needed.  Consider a trial of steroids or indomethacin.  ESR/CRP.  Supportive care regarding his chronic lower back and left lower extremity complaints\par

## 2022-11-14 NOTE — REASON FOR VISIT
[Follow-Up: _____] : a [unfilled] follow-up visit [FreeTextEntry1] : 3 to 4 months of recurrent left frontal short duration headache and a tendency to slap the left foot when ambulating

## 2022-11-14 NOTE — PHYSICAL EXAM
[FreeTextEntry1] : Head:  Normocephalic Neck: Supple nontender no carotid bruits.  Spine: Reduced forward bending spinal deformity negative straight leg raising\par \par Mental Status:  Alert Oriented X3 Speech normal and no aphasia or dysarthria.\par \par Cranial Nerves:  PERRL, Fundi normal Visual Fields full  EOMI no diplopia no ptosis no nystagmus, V through XII intact.\par \par Motor: No drift of the upper extremities good strength normal tone no definite focal weakness including testing of the both lower extremities with a complaint of left foot slap.\par \par DTRs: Symmetric hypoactive throughout.  Plantars flexor.  No Clonus.\par \par Sensory: Reduced pinprick vibration distally in the lowers.\par \par Gait: Basically unchanged stooped posture unsteady but able to attempt bilateral heel and toe walking and foot tapping.\par

## 2022-11-14 NOTE — CONSULT LETTER
[Dear  ___] : Dear  [unfilled], [Consult Letter:] : I had the pleasure of evaluating your patient, [unfilled]. [Please see my note below.] : Please see my note below. [Consult Closing:] : Thank you very much for allowing me to participate in the care of this patient.  If you have any questions, please do not hesitate to contact me. [Sincerely,] : Sincerely, [FreeTextEntry3] : Jeremy Craig MD\par

## 2022-11-17 ENCOUNTER — NON-APPOINTMENT (OUTPATIENT)
Age: 77
End: 2022-11-17

## 2022-11-17 LAB
BASOPHILS # BLD AUTO: 0.03 K/UL
BASOPHILS NFR BLD AUTO: 0.4 %
CRP SERPL-MCNC: <3 MG/L
EOSINOPHIL # BLD AUTO: 0.24 K/UL
EOSINOPHIL NFR BLD AUTO: 3 %
ERYTHROCYTE [SEDIMENTATION RATE] IN BLOOD BY WESTERGREN METHOD: 24 MM/HR
HCT VFR BLD CALC: 41.3 %
HGB BLD-MCNC: 14.1 G/DL
IMM GRANULOCYTES NFR BLD AUTO: 0.9 %
LYMPHOCYTES # BLD AUTO: 1.32 K/UL
LYMPHOCYTES NFR BLD AUTO: 16.4 %
MAN DIFF?: NORMAL
MCHC RBC-ENTMCNC: 34.1 GM/DL
MCHC RBC-ENTMCNC: 35 PG
MCV RBC AUTO: 102.5 FL
MONOCYTES # BLD AUTO: 0.83 K/UL
MONOCYTES NFR BLD AUTO: 10.3 %
NEUTROPHILS # BLD AUTO: 5.57 K/UL
NEUTROPHILS NFR BLD AUTO: 69 %
PLATELET # BLD AUTO: 206 K/UL
RBC # BLD: 4.03 M/UL
RBC # FLD: 13.8 %
WBC # FLD AUTO: 8.06 K/UL

## 2022-11-22 ENCOUNTER — OUTPATIENT (OUTPATIENT)
Dept: OUTPATIENT SERVICES | Facility: HOSPITAL | Age: 77
LOS: 1 days | End: 2022-11-22
Payer: MEDICARE

## 2022-11-22 ENCOUNTER — NON-APPOINTMENT (OUTPATIENT)
Age: 77
End: 2022-11-22

## 2022-11-22 ENCOUNTER — APPOINTMENT (OUTPATIENT)
Dept: MRI IMAGING | Facility: HOSPITAL | Age: 77
End: 2022-11-22

## 2022-11-22 DIAGNOSIS — R51.9 HEADACHE, UNSPECIFIED: ICD-10-CM

## 2022-11-22 PROCEDURE — A9579: CPT

## 2022-11-22 PROCEDURE — 70553 MRI BRAIN STEM W/O & W/DYE: CPT

## 2022-11-22 PROCEDURE — 70553 MRI BRAIN STEM W/O & W/DYE: CPT | Mod: 26,MH

## 2022-12-06 ENCOUNTER — NON-APPOINTMENT (OUTPATIENT)
Age: 77
End: 2022-12-06

## 2023-01-02 ENCOUNTER — NON-APPOINTMENT (OUTPATIENT)
Age: 78
End: 2023-01-02

## 2023-01-21 NOTE — ED PROVIDER NOTE - NS_EDPROVIDERDISPOUSERTYPE_ED_A_ED
Please clarify, did you mean 10mg Monday and 8mg other days?   Attending Attestation (For Attendings USE Only)...

## 2023-03-17 ENCOUNTER — OUTPATIENT (OUTPATIENT)
Dept: OUTPATIENT SERVICES | Facility: HOSPITAL | Age: 78
LOS: 1 days | End: 2023-03-17
Payer: MEDICARE

## 2023-03-17 ENCOUNTER — APPOINTMENT (OUTPATIENT)
Dept: RADIOLOGY | Facility: HOSPITAL | Age: 78
End: 2023-03-17

## 2023-03-17 DIAGNOSIS — Z87.898 PERSONAL HISTORY OF OTHER SPECIFIED CONDITIONS: Chronic | ICD-10-CM

## 2023-03-17 DIAGNOSIS — Z98.1 ARTHRODESIS STATUS: Chronic | ICD-10-CM

## 2023-03-17 DIAGNOSIS — Z00.8 ENCOUNTER FOR OTHER GENERAL EXAMINATION: ICD-10-CM

## 2023-03-17 DIAGNOSIS — Z98.89 OTHER SPECIFIED POSTPROCEDURAL STATES: Chronic | ICD-10-CM

## 2023-03-17 PROCEDURE — 73502 X-RAY EXAM HIP UNI 2-3 VIEWS: CPT

## 2023-03-17 PROCEDURE — 73502 X-RAY EXAM HIP UNI 2-3 VIEWS: CPT | Mod: 26,LT

## 2023-03-21 ENCOUNTER — TRANSCRIPTION ENCOUNTER (OUTPATIENT)
Age: 78
End: 2023-03-21

## 2023-03-21 NOTE — ASU PATIENT PROFILE, ADULT - NSICDXPASTSURGICALHX_GEN_ALL_CORE_FT
PAST SURGICAL HISTORY:  H/O cardiac catheterization 2010 c/o tightness chest , cardiac workup normal    History of tennis elbow s/p surgery b/l    S/P knee surgery b/l    S/P lumbar fusion 2009

## 2023-03-21 NOTE — ASU PATIENT PROFILE, ADULT - FALL HARM RISK - UNIVERSAL INTERVENTIONS
Bed in lowest position, wheels locked, appropriate side rails in place/Call bell, personal items and telephone in reach/Instruct patient to call for assistance before getting out of bed or chair/Non-slip footwear when patient is out of bed/[place] to call system/Physically safe environment - no spills, clutter or unnecessary equipment/Purposeful Proactive Rounding/Room/bathroom lighting operational, light cord in reach

## 2023-03-21 NOTE — ASU PATIENT PROFILE, ADULT - ABILITY TO HEAR (WITH HEARING AID OR HEARING APPLIANCE IF NORMALLY USED):
Mildly to Moderately Impaired: difficulty hearing in some environments or speaker may need to increase volume or speak distinctly bilateral hearing aids/Adequate: hears normal conversation without difficulty

## 2023-03-21 NOTE — ASU PATIENT PROFILE, ADULT - PATIENT'S SEXUAL ORIENTATION
"Pt arrives to the ED from home via EMS with CC of right sided pain s/p falling out of bed. Pt c/o right sided knee, hip, lower back, and mid cervical tenderness. Pt states pain is 9/10 and describes it as ""pulling. \"" Pt denies hitting head or LOC, or taking blood thinners. Pt A & O x4. GCS 15.   "
Heterosexual

## 2023-03-21 NOTE — ASU PATIENT PROFILE, ADULT - NSICDXPASTMEDICALHX_GEN_ALL_CORE_FT
PAST MEDICAL HISTORY:  Chronic pain lumbar, left hip    Diabetes type 2, controlled     Gout     [place] (hard of hearing), bilateral b/l hearing aids    HTN (hypertension)     Hypercholesteremia     Meniscus tear     Seizure disorder on meds last seizure 2011    Septicemia Pt states he had spsis 2010 and was in hospital for 40 days, sent home on IV antibiotics    Spinal stenosis of thoracic region     Tennis elbow     Thoracic spondylosis      PAST MEDICAL HISTORY:  Chronic pain lumbar, left hip    Diabetes type 2, controlled     Gout     [place] (hard of hearing), bilateral b/l hearing aids    HTN (hypertension)     Hypercholesteremia     Meniscus tear     Seizure disorder on meds last seizure 2011    Septicemia Pt states he had spsis 2010 and was in hospital for 40 days, sent home on IV antibiotics    Spinal stenosis of thoracic region and lumbar region    Tennis elbow     Thoracic spondylosis

## 2023-03-22 ENCOUNTER — TRANSCRIPTION ENCOUNTER (OUTPATIENT)
Age: 78
End: 2023-03-22

## 2023-03-22 ENCOUNTER — OUTPATIENT (OUTPATIENT)
Dept: OUTPATIENT SERVICES | Facility: HOSPITAL | Age: 78
LOS: 1 days | End: 2023-03-22
Payer: MEDICARE

## 2023-03-22 VITALS
DIASTOLIC BLOOD PRESSURE: 69 MMHG | SYSTOLIC BLOOD PRESSURE: 126 MMHG | TEMPERATURE: 98 F | HEART RATE: 64 BPM | RESPIRATION RATE: 15 BRPM

## 2023-03-22 VITALS
SYSTOLIC BLOOD PRESSURE: 110 MMHG | DIASTOLIC BLOOD PRESSURE: 62 MMHG | HEART RATE: 62 BPM | OXYGEN SATURATION: 97 % | RESPIRATION RATE: 16 BRPM | TEMPERATURE: 98 F

## 2023-03-22 DIAGNOSIS — Z98.1 ARTHRODESIS STATUS: Chronic | ICD-10-CM

## 2023-03-22 DIAGNOSIS — Z98.89 OTHER SPECIFIED POSTPROCEDURAL STATES: Chronic | ICD-10-CM

## 2023-03-22 DIAGNOSIS — H25.10 AGE-RELATED NUCLEAR CATARACT, UNSPECIFIED EYE: ICD-10-CM

## 2023-03-22 DIAGNOSIS — Z87.898 PERSONAL HISTORY OF OTHER SPECIFIED CONDITIONS: Chronic | ICD-10-CM

## 2023-03-22 LAB — GLUCOSE BLDC GLUCOMTR-MCNC: 144 MG/DL — HIGH (ref 70–99)

## 2023-03-22 PROCEDURE — 66984 XCAPSL CTRC RMVL W/O ECP: CPT | Mod: LT

## 2023-03-22 PROCEDURE — 82962 GLUCOSE BLOOD TEST: CPT

## 2023-03-22 PROCEDURE — V2632: CPT

## 2023-03-22 DEVICE — LENS IOL ACRYSOF SN60WF 21.0D
Type: IMPLANTABLE DEVICE | Site: LEFT | Status: NON-FUNCTIONAL
Removed: 2023-03-22

## 2023-03-22 RX ORDER — CYCLOPENTOLATE HYDROCHLORIDE 10 MG/ML
1 SOLUTION/ DROPS OPHTHALMIC
Refills: 0 | Status: COMPLETED | OUTPATIENT
Start: 2023-03-22 | End: 2023-03-22

## 2023-03-22 RX ORDER — ACETAMINOPHEN 500 MG
650 TABLET ORAL EVERY 6 HOURS
Refills: 0 | Status: DISCONTINUED | OUTPATIENT
Start: 2023-03-22 | End: 2023-04-05

## 2023-03-22 RX ORDER — TROPICAMIDE 1 %
1 DROPS OPHTHALMIC (EYE)
Refills: 0 | Status: COMPLETED | OUTPATIENT
Start: 2023-03-22 | End: 2023-03-22

## 2023-03-22 RX ORDER — KETOROLAC TROMETHAMINE 0.5 %
1 DROPS OPHTHALMIC (EYE)
Refills: 0 | Status: COMPLETED | OUTPATIENT
Start: 2023-03-22 | End: 2023-03-22

## 2023-03-22 RX ORDER — PHENYLEPHRINE HCL 2.5 %
1 DROPS OPHTHALMIC (EYE)
Refills: 0 | Status: COMPLETED | OUTPATIENT
Start: 2023-03-22 | End: 2023-03-22

## 2023-03-22 RX ORDER — SODIUM CHLORIDE 9 MG/ML
1000 INJECTION, SOLUTION INTRAVENOUS
Refills: 0 | Status: DISCONTINUED | OUTPATIENT
Start: 2023-03-22 | End: 2023-03-22

## 2023-03-22 RX ADMIN — CYCLOPENTOLATE HYDROCHLORIDE 1 DROP(S): 10 SOLUTION/ DROPS OPHTHALMIC at 09:18

## 2023-03-22 RX ADMIN — Medication 1 DROP(S): at 09:18

## 2023-03-22 RX ADMIN — Medication 1 DROP(S): at 09:12

## 2023-03-22 RX ADMIN — Medication 1 DROP(S): at 09:23

## 2023-03-22 RX ADMIN — CYCLOPENTOLATE HYDROCHLORIDE 1 DROP(S): 10 SOLUTION/ DROPS OPHTHALMIC at 09:23

## 2023-03-22 RX ADMIN — Medication 1 DROP(S): at 09:17

## 2023-03-22 RX ADMIN — Medication 1 DROP(S): at 09:22

## 2023-03-22 RX ADMIN — Medication 1 DROP(S): at 09:13

## 2023-03-22 RX ADMIN — CYCLOPENTOLATE HYDROCHLORIDE 1 DROP(S): 10 SOLUTION/ DROPS OPHTHALMIC at 09:13

## 2023-03-22 RX ADMIN — SODIUM CHLORIDE 40 MILLILITER(S): 9 INJECTION, SOLUTION INTRAVENOUS at 09:34

## 2023-03-22 NOTE — BRIEF OPERATIVE NOTE - NSICDXBRIEFPROCEDURE_GEN_ALL_CORE_FT
PROCEDURES:  Single stage extracapsular removal of cataract with insertion of intraocular lens prosthesis by phacoemulsification 22-Mar-2023 11:27:46  Armando Partida

## 2023-03-22 NOTE — ASU DISCHARGE PLAN (ADULT/PEDIATRIC) - ASU DC SPECIAL INSTRUCTIONSFT
Keep shield on eye until office visit today at 2:15 pm  Any problems call office at [phone]    OFFICE VISIT TODAY AT 2:15 pm  Bring Drops

## 2023-03-22 NOTE — ASU DISCHARGE PLAN (ADULT/PEDIATRIC) - NURSING INSTRUCTIONS
Drink plenty of fluids. Take tylenol as needed for discomfort. Follow up with MD for post-op appointment.

## 2023-03-28 ENCOUNTER — APPOINTMENT (OUTPATIENT)
Dept: MRI IMAGING | Facility: HOSPITAL | Age: 78
End: 2023-03-28
Payer: MEDICARE

## 2023-03-28 ENCOUNTER — OUTPATIENT (OUTPATIENT)
Dept: OUTPATIENT SERVICES | Facility: HOSPITAL | Age: 78
LOS: 1 days | End: 2023-03-28
Payer: MEDICARE

## 2023-03-28 DIAGNOSIS — Z87.898 PERSONAL HISTORY OF OTHER SPECIFIED CONDITIONS: Chronic | ICD-10-CM

## 2023-03-28 DIAGNOSIS — Z00.8 ENCOUNTER FOR OTHER GENERAL EXAMINATION: ICD-10-CM

## 2023-03-28 DIAGNOSIS — Z98.89 OTHER SPECIFIED POSTPROCEDURAL STATES: Chronic | ICD-10-CM

## 2023-03-28 DIAGNOSIS — Z98.1 ARTHRODESIS STATUS: Chronic | ICD-10-CM

## 2023-03-28 PROCEDURE — 73721 MRI JNT OF LWR EXTRE W/O DYE: CPT

## 2023-03-28 PROCEDURE — 73721 MRI JNT OF LWR EXTRE W/O DYE: CPT | Mod: 26,LT,MH

## 2023-04-02 ENCOUNTER — LABORATORY RESULT (OUTPATIENT)
Age: 78
End: 2023-04-02

## 2023-04-03 NOTE — ED PROVIDER NOTE - NS ED ATTENDING STATEMENT MOD
RENETTA FLYNN I have personally performed a face to face diagnostic evaluation on this patient. I have reviewed the ACP note and agree with the history, exam and plan of care, except as noted.

## 2023-04-18 LAB
BASOPHILS # BLD AUTO: 0.05 K/UL
BASOPHILS NFR BLD AUTO: 0.6 %
CALCIUM SERPL-MCNC: 9.5 MG/DL
CREAT SERPL-MCNC: 1.24 MG/DL
DEPRECATED KAPPA LC FREE/LAMBDA SER: 1.17 RATIO
EGFR: 60 ML/MIN/1.73M2
EOSINOPHIL # BLD AUTO: 0.32 K/UL
EOSINOPHIL NFR BLD AUTO: 4.1 %
HCT VFR BLD CALC: 41.7 %
HGB BLD-MCNC: 13.6 G/DL
IGA SER QL IEP: 383 MG/DL
IGG SER QL IEP: 753 MG/DL
IGM SER QL IEP: 103 MG/DL
IMM GRANULOCYTES NFR BLD AUTO: 0.8 %
KAPPA LC CSF-MCNC: 2.52 MG/DL
KAPPA LC SERPL-MCNC: 2.96 MG/DL
LYMPHOCYTES # BLD AUTO: 1.14 K/UL
LYMPHOCYTES NFR BLD AUTO: 14.7 %
MAN DIFF?: NORMAL
MCHC RBC-ENTMCNC: 32.6 GM/DL
MCHC RBC-ENTMCNC: 33.8 PG
MCV RBC AUTO: 103.7 FL
MONOCYTES # BLD AUTO: 0.8 K/UL
MONOCYTES NFR BLD AUTO: 10.3 %
NEUTROPHILS # BLD AUTO: 5.36 K/UL
NEUTROPHILS NFR BLD AUTO: 69.5 %
PLATELET # BLD AUTO: 295 K/UL
RBC # BLD: 4.02 M/UL
RBC # FLD: 13.4 %
WBC # FLD AUTO: 7.73 K/UL

## 2023-04-20 LAB
ALBUMIN MFR SERPL ELPH: 54.1 %
ALBUMIN SERPL-MCNC: 3.6 G/DL
ALBUMIN/GLOB SERPL: 1.2 RATIO
ALPHA1 GLOB MFR SERPL ELPH: 5.3 %
ALPHA1 GLOB SERPL ELPH-MCNC: 0.4 G/DL
ALPHA2 GLOB MFR SERPL ELPH: 16.2 %
ALPHA2 GLOB SERPL ELPH-MCNC: 1.1 G/DL
B-GLOBULIN MFR SERPL ELPH: 14.5 %
B-GLOBULIN SERPL ELPH-MCNC: 1 G/DL
GAMMA GLOB FLD ELPH-MCNC: 0.7 G/DL
GAMMA GLOB MFR SERPL ELPH: 9.9 %
INTERPRETATION SERPL IEP-IMP: NORMAL
M PROTEIN MFR SERPL ELPH: NORMAL
MONOCLON BAND OBS SERPL: NORMAL
PROT SERPL-MCNC: 6.7 G/DL
PROT SERPL-MCNC: 6.7 G/DL

## 2023-04-22 ENCOUNTER — OUTPATIENT (OUTPATIENT)
Dept: OUTPATIENT SERVICES | Facility: HOSPITAL | Age: 78
LOS: 1 days | Discharge: HOME | End: 2023-04-22

## 2023-04-22 DIAGNOSIS — Z98.1 ARTHRODESIS STATUS: Chronic | ICD-10-CM

## 2023-04-22 DIAGNOSIS — D64.9 ANEMIA, UNSPECIFIED: ICD-10-CM

## 2023-04-22 DIAGNOSIS — Z87.898 PERSONAL HISTORY OF OTHER SPECIFIED CONDITIONS: Chronic | ICD-10-CM

## 2023-04-22 DIAGNOSIS — Z98.89 OTHER SPECIFIED POSTPROCEDURAL STATES: Chronic | ICD-10-CM

## 2023-04-26 ENCOUNTER — APPOINTMENT (OUTPATIENT)
Dept: HEMATOLOGY ONCOLOGY | Facility: CLINIC | Age: 78
End: 2023-04-26
Payer: MEDICARE

## 2023-04-26 VITALS
TEMPERATURE: 98 F | DIASTOLIC BLOOD PRESSURE: 72 MMHG | HEART RATE: 74 BPM | WEIGHT: 213.85 LBS | OXYGEN SATURATION: 96 % | BODY MASS INDEX: 32.41 KG/M2 | HEIGHT: 67.99 IN | RESPIRATION RATE: 16 BRPM | SYSTOLIC BLOOD PRESSURE: 136 MMHG

## 2023-04-26 PROCEDURE — 99213 OFFICE O/P EST LOW 20 MIN: CPT

## 2023-04-26 NOTE — HISTORY OF PRESENT ILLNESS
[de-identified] : 7/2021-Patient presented at the request of his neurologist for an abnormal serum immunofixation revealing 2-weak IgA kappa bands.  He had been undergoing a neurologic evaluation for a 6-month history of imbalance and unsteady gait.  Dr. Craig felt his neurological exam remained consistent with spinal deformity having had prior surgery and peripheral neuropathy. Suspected a multifactorial gait disorder.\par \par 8/17/21-Bone marrow biopsy and bone marrow aspirate showed monotypic plasma cells involving 2-3% of cellularity.  Cytogenetics with normal male karyotype.  Normal myeloma FISH panel.\par \par \par \par  [de-identified] : Goes to the [facility] urology appt. there tomorrow for elevated PSA (>4).\par No current pulmonary, GI complaints. No fevers. No h/o bleeding. No LN complaints.\par Last donated blood 4/5/22, though was unable to donate platelets.\par [facility].\par \par

## 2023-04-26 NOTE — ASSESSMENT
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  Cytogenetics with normal male karyotype.  Normal myeloma FISH panel.\par 7/12/2021-Skeletal survey-tiny superior calvarial lucencies thought to most likely represent venous lakes or pacchionian granulations rather than myelomatous lesions. No additional suspicious lytic lesions on remainder of the survey.\par \par Currently c/w MGUS. Reviewed potential complications if a plasma cell disorder progresses. Clinically stable at present. Hematologic surveillance.\par \par Patient was given the opportunity to ask questions.  His questions have been answered to his apparent satisfaction.  He expressed his understanding and willingness to comply with recommended follow-up.

## 2023-04-26 NOTE — PHYSICAL EXAM
[Normal] : affect appropriate [de-identified] : breathing appeared unlabored [de-identified] : A &O x 3

## 2023-10-19 ENCOUNTER — LABORATORY RESULT (OUTPATIENT)
Age: 78
End: 2023-10-19

## 2023-10-19 LAB
CALCIUM SERPL-MCNC: 9.3 MG/DL
DEPRECATED KAPPA LC FREE/LAMBDA SER: 1.55 RATIO
IGA SER QL IEP: 358 MG/DL
IGG SER QL IEP: 685 MG/DL
IGM SER QL IEP: 98 MG/DL
KAPPA LC CSF-MCNC: 1.85 MG/DL
KAPPA LC SERPL-MCNC: 2.86 MG/DL

## 2023-10-21 LAB
ALBUMIN MFR SERPL ELPH: 59 %
ALBUMIN SERPL-MCNC: 3.9 G/DL
ALBUMIN/GLOB SERPL: 1.4 RATIO
ALPHA1 GLOB MFR SERPL ELPH: 3.5 %
ALPHA1 GLOB SERPL ELPH-MCNC: 0.2 G/DL
ALPHA2 GLOB MFR SERPL ELPH: 13 %
ALPHA2 GLOB SERPL ELPH-MCNC: 0.9 G/DL
B-GLOBULIN MFR SERPL ELPH: 10.9 %
B-GLOBULIN SERPL ELPH-MCNC: 0.7 G/DL
GAMMA GLOB FLD ELPH-MCNC: 0.9 G/DL
GAMMA GLOB MFR SERPL ELPH: 13.6 %
INTERPRETATION SERPL IEP-IMP: NORMAL
M PROTEIN MFR SERPL ELPH: 2.3 %
MONOCLON BAND OBS SERPL: 0.2 G/DL
PROT SERPL-MCNC: 6.6 G/DL
PROT SERPL-MCNC: 6.6 G/DL

## 2023-10-25 ENCOUNTER — OUTPATIENT (OUTPATIENT)
Dept: OUTPATIENT SERVICES | Facility: HOSPITAL | Age: 78
LOS: 1 days | Discharge: HOME | End: 2023-10-25

## 2023-10-25 DIAGNOSIS — Z98.89 OTHER SPECIFIED POSTPROCEDURAL STATES: Chronic | ICD-10-CM

## 2023-10-25 DIAGNOSIS — D64.9 ANEMIA, UNSPECIFIED: ICD-10-CM

## 2023-10-25 DIAGNOSIS — Z98.1 ARTHRODESIS STATUS: Chronic | ICD-10-CM

## 2023-10-25 DIAGNOSIS — Z87.898 PERSONAL HISTORY OF OTHER SPECIFIED CONDITIONS: Chronic | ICD-10-CM

## 2023-10-26 ENCOUNTER — APPOINTMENT (OUTPATIENT)
Dept: HEMATOLOGY ONCOLOGY | Facility: CLINIC | Age: 78
End: 2023-10-26
Payer: MEDICARE

## 2023-10-26 VITALS
BODY MASS INDEX: 31.18 KG/M2 | TEMPERATURE: 97.3 F | SYSTOLIC BLOOD PRESSURE: 119 MMHG | WEIGHT: 205.03 LBS | DIASTOLIC BLOOD PRESSURE: 72 MMHG | HEART RATE: 71 BPM | OXYGEN SATURATION: 96 % | RESPIRATION RATE: 16 BRPM

## 2023-10-26 PROCEDURE — 99213 OFFICE O/P EST LOW 20 MIN: CPT

## 2023-11-28 ENCOUNTER — APPOINTMENT (OUTPATIENT)
Dept: NEUROLOGY | Facility: CLINIC | Age: 78
End: 2023-11-28
Payer: MEDICARE

## 2023-11-28 VITALS
WEIGHT: 205 LBS | TEMPERATURE: 97.6 F | BODY MASS INDEX: 31.07 KG/M2 | DIASTOLIC BLOOD PRESSURE: 58 MMHG | HEIGHT: 68 IN | OXYGEN SATURATION: 98 % | HEART RATE: 63 BPM | SYSTOLIC BLOOD PRESSURE: 95 MMHG

## 2023-11-28 DIAGNOSIS — M25.511 PAIN IN RIGHT SHOULDER: ICD-10-CM

## 2023-11-28 DIAGNOSIS — M47.812 SPONDYLOSIS W/OUT MYELOPATHY OR RADICULOPATHY, CERVICAL REGION: ICD-10-CM

## 2023-11-28 PROCEDURE — 99215 OFFICE O/P EST HI 40 MIN: CPT

## 2023-11-28 RX ORDER — ASPIRIN 325 MG/1
325 TABLET, FILM COATED ORAL
Refills: 0 | Status: ACTIVE | COMMUNITY

## 2023-11-28 RX ORDER — ALOGLIPTIN 12.5 MG/1
12.5 TABLET, FILM COATED ORAL
Refills: 0 | Status: ACTIVE | COMMUNITY

## 2023-11-28 RX ORDER — TERBINAFINE HYDROCHLORIDE 250 MG/1
250 TABLET ORAL
Qty: 30 | Refills: 0 | Status: COMPLETED | COMMUNITY
Start: 2022-10-07 | End: 2023-11-28

## 2023-11-28 RX ORDER — METOPROLOL TARTRATE 50 MG/1
50 TABLET, FILM COATED ORAL
Refills: 0 | Status: COMPLETED | COMMUNITY
End: 2023-11-28

## 2023-11-28 RX ORDER — SITAGLIPTIN 25 MG/1
25 TABLET, FILM COATED ORAL
Refills: 0 | Status: ACTIVE | COMMUNITY

## 2023-11-28 RX ORDER — NAPROXEN 500 MG/1
500 TABLET ORAL
Qty: 30 | Refills: 1 | Status: COMPLETED | COMMUNITY
Start: 2022-11-14 | End: 2023-11-28

## 2023-11-28 RX ORDER — METOPROLOL SUCCINATE 50 MG/1
50 TABLET, EXTENDED RELEASE ORAL DAILY
Refills: 0 | Status: COMPLETED | COMMUNITY
End: 2023-11-28

## 2023-12-04 ENCOUNTER — APPOINTMENT (OUTPATIENT)
Dept: MRI IMAGING | Facility: HOSPITAL | Age: 78
End: 2023-12-04
Payer: MEDICARE

## 2023-12-04 ENCOUNTER — OUTPATIENT (OUTPATIENT)
Dept: OUTPATIENT SERVICES | Facility: HOSPITAL | Age: 78
LOS: 1 days | End: 2023-12-04
Payer: MEDICARE

## 2023-12-04 ENCOUNTER — RESULT REVIEW (OUTPATIENT)
Age: 78
End: 2023-12-04

## 2023-12-04 DIAGNOSIS — Z87.898 PERSONAL HISTORY OF OTHER SPECIFIED CONDITIONS: Chronic | ICD-10-CM

## 2023-12-04 DIAGNOSIS — Z98.89 OTHER SPECIFIED POSTPROCEDURAL STATES: Chronic | ICD-10-CM

## 2023-12-04 DIAGNOSIS — M54.12 RADICULOPATHY, CERVICAL REGION: ICD-10-CM

## 2023-12-04 DIAGNOSIS — M47.812 SPONDYLOSIS WITHOUT MYELOPATHY OR RADICULOPATHY, CERVICAL REGION: ICD-10-CM

## 2023-12-04 DIAGNOSIS — Z98.1 ARTHRODESIS STATUS: Chronic | ICD-10-CM

## 2023-12-04 PROCEDURE — 73221 MRI JOINT UPR EXTREM W/O DYE: CPT | Mod: 26,RT,MH

## 2023-12-04 PROCEDURE — 72141 MRI NECK SPINE W/O DYE: CPT | Mod: 26,MH

## 2023-12-04 PROCEDURE — 72141 MRI NECK SPINE W/O DYE: CPT

## 2023-12-04 PROCEDURE — 73221 MRI JOINT UPR EXTREM W/O DYE: CPT

## 2023-12-07 ENCOUNTER — NON-APPOINTMENT (OUTPATIENT)
Age: 78
End: 2023-12-07

## 2023-12-11 ENCOUNTER — APPOINTMENT (OUTPATIENT)
Dept: ORTHOPEDIC SURGERY | Facility: CLINIC | Age: 78
End: 2023-12-11
Payer: MEDICARE

## 2023-12-11 VITALS — BODY MASS INDEX: 31.07 KG/M2 | HEIGHT: 68 IN | WEIGHT: 205 LBS

## 2023-12-11 DIAGNOSIS — C80.1 MALIGNANT (PRIMARY) NEOPLASM, UNSPECIFIED: ICD-10-CM

## 2023-12-11 PROCEDURE — 99214 OFFICE O/P EST MOD 30 MIN: CPT | Mod: 25

## 2023-12-11 PROCEDURE — 73030 X-RAY EXAM OF SHOULDER: CPT | Mod: RT

## 2023-12-11 PROCEDURE — 20611 DRAIN/INJ JOINT/BURSA W/US: CPT | Mod: RT

## 2023-12-11 PROCEDURE — 73010 X-RAY EXAM OF SHOULDER BLADE: CPT | Mod: RT

## 2023-12-12 NOTE — HISTORY OF PRESENT ILLNESS
[] : Post Surgical Visit: no [FreeTextEntry1] : Rt shoulder [FreeTextEntry5] : pt here for Rt shoulder. states he was here last November and got shots from DR VALENZUELA [FreeTextEntry7] : to the neck [FreeTextEntry9] : tylenol/ prednisone no relief [de-identified] : csi in nov of 2022

## 2023-12-12 NOTE — ASSESSMENT
[FreeTextEntry1] : The patient is a 78-year-old male who comes in today with follow-up on his right shoulder.  He is right-hand dominant.  He has a 1 year history of worsening pain in the right shoulder.  He has significant stiffness in the right shoulder.  He has pain at night and is worse with golfing.  He has had cortisone with limited relief.  Examination of the right upper extremity reveals a normal neurovascular exam.  Examination of the right shoulder reveals forward elevation to 140 degrees external rotation to 30 degrees and internal rotation to L5.  There is 5 out of 5 strength of deltoid and rotator cuff.  There is pain at the extremes of motion.  There is no instability or apprehension.  There is no pain or deformity over the AC joint.  X-rays done in the office today of the right shoulder 5 views including a weighted view show bone-on-bone arthritis of the glenohumeral joint with no proximal migration.  There is no obvious tumors, masses or calcifications seen.  Her recent MRI from [facility] of the right shoulder shows an intact rotator cuff with severe osteoarthritic changes.  Under sterile conditions the right shoulder was injected intra-articularly with 20mg /2 cc Euflexxa.  The procedure was performed under ultrasound guidance.  They tolerated the procedure well.  Plan at this point is ice and activity modification.  He will return next week for his second Euflexxa injection intra-articularly into the right shoulder.

## 2023-12-14 ENCOUNTER — APPOINTMENT (OUTPATIENT)
Dept: ORTHOPEDIC SURGERY | Facility: CLINIC | Age: 78
End: 2023-12-14

## 2023-12-18 ENCOUNTER — APPOINTMENT (OUTPATIENT)
Dept: ORTHOPEDIC SURGERY | Facility: CLINIC | Age: 78
End: 2023-12-18
Payer: MEDICARE

## 2023-12-18 VITALS — WEIGHT: 205 LBS | HEIGHT: 68 IN | BODY MASS INDEX: 31.07 KG/M2

## 2023-12-18 PROCEDURE — 99213 OFFICE O/P EST LOW 20 MIN: CPT | Mod: 25

## 2023-12-18 PROCEDURE — 20610 DRAIN/INJ JOINT/BURSA W/O US: CPT | Mod: RT

## 2023-12-19 NOTE — HISTORY OF PRESENT ILLNESS
[9] : 9 [Burning] : burning [Dull/Aching] : dull/aching [Throbbing] : throbbing [Constant] : constant [Household chores] : household chores [Rest] : rest [Meds] : meds [Heat] : heat [Exercising] : exercising [Lying in bed] : lying in bed [Retired] : Work status: retired [Nothing helps with pain getting better] : Nothing helps with pain getting better [2] : 2 [Euflexxa] : Euflexxa [] : Post Surgical Visit: no [FreeTextEntry5] : 79 y/o M presents for Euflexxa #2 for rt shoulder today. [FreeTextEntry7] : to the neck [FreeTextEntry9] : tylenol/ prednisone no relief [de-identified] : 12/11/23 [de-identified] : 12/11/23 [de-identified] : rt shoulder [de-identified] : euflexx

## 2023-12-19 NOTE — ASSESSMENT
[FreeTextEntry1] : The patient is a 78-year-old male who comes in today with follow-up on his right shoulder.  He is right-hand dominant.  He has a 1 year history of worsening pain in the right shoulder.  He has significant stiffness in the right shoulder.  He has pain at night and is worse with golfing.  He has had cortisone with limited relief.  Examination of the right upper extremity reveals a normal neurovascular exam.  Examination of the right shoulder reveals forward elevation to 140 degrees external rotation to 30 degrees and internal rotation to L5.  There is 5 out of 5 strength of deltoid and rotator cuff.  There is pain at the extremes of motion.  There is no instability or apprehension.  There is no pain or deformity over the AC joint.  The patient received a right shoulder Euflexxa #2 injection today. He tolerated it well. He will return in 1 week.

## 2023-12-19 NOTE — PROCEDURE
[Large Joint Injection] : Large joint injection [Right] : of the right [Glenohumeral Joint] : glenohumeral joint [Pain] : pain [Inflammation] : inflammation [X-ray evidence of Osteoarthritis on this or prior visit] : x-ray evidence of Osteoarthritis on this or prior visit [Alcohol] : alcohol [Betadine] : betadine [Ethyl Chloride sprayed topically] : ethyl chloride sprayed topically [Sterile technique used] : sterile technique used [Euflexxa(20mg)] : 20mg of Euflexxa [#2] : series #2 [] : Patient tolerated procedure well [Call if redness, pain or fever occur] : call if redness, pain or fever occur [Previous OTC use and PT nontherapeutic] : patient has tried OTC's including aspirin, Ibuprofen, Aleve, etc or prescription NSAIDS, and/or exercises at home and/or physical therapy without satisfactory response [Patient had decreased mobility in the joint] : patient had decreased mobility in the joint [Risks, benefits, alternatives discussed / Verbal consent obtained] : the risks benefits, and alternatives have been discussed, and verbal consent was obtained

## 2023-12-28 ENCOUNTER — APPOINTMENT (OUTPATIENT)
Dept: ORTHOPEDIC SURGERY | Facility: CLINIC | Age: 78
End: 2023-12-28
Payer: MEDICARE

## 2023-12-28 VITALS — BODY MASS INDEX: 31.07 KG/M2 | HEIGHT: 68 IN | WEIGHT: 205 LBS

## 2023-12-28 DIAGNOSIS — M19.011 PRIMARY OSTEOARTHRITIS, RIGHT SHOULDER: ICD-10-CM

## 2023-12-28 PROCEDURE — 20610 DRAIN/INJ JOINT/BURSA W/O US: CPT | Mod: RT

## 2023-12-28 PROCEDURE — 99213 OFFICE O/P EST LOW 20 MIN: CPT | Mod: 25

## 2023-12-28 NOTE — ASSESSMENT
[FreeTextEntry1] : The patient is here to continue with Euflexxa injections. He is right-hand dominant.  He has a 1 year history of worsening pain in the right shoulder.  He has significant stiffness in the right shoulder.  He has pain at night and is worse with golfing.  He has had cortisone with limited relief.  Examination of the right upper extremity reveals a normal neurovascular exam.  Examination of the right shoulder reveals forward elevation to 140 degrees external rotation to 30 degrees and internal rotation to L5.  There is 5 out of 5 strength of deltoid and rotator cuff.  There is pain at the extremes of motion.  There is no instability or apprehension.  There is no pain or deformity over the AC joint.  The patient received a right shoulder Euflexxa #3 injection today. He tolerated it well. He will return in 8 weeks with Dr. Alba as needed.

## 2023-12-28 NOTE — PROCEDURE
[Large Joint Injection] : Large joint injection [Right] : of the right [Glenohumeral Joint] : glenohumeral joint [Pain] : pain [Inflammation] : inflammation [X-ray evidence of Osteoarthritis on this or prior visit] : x-ray evidence of Osteoarthritis on this or prior visit [Alcohol] : alcohol [Betadine] : betadine [Ethyl Chloride sprayed topically] : ethyl chloride sprayed topically [Sterile technique used] : sterile technique used [Euflexxa(20mg)] : 20mg of Euflexxa [#3] : series #3 [] : Patient tolerated procedure well [Call if redness, pain or fever occur] : call if redness, pain or fever occur [Previous OTC use and PT nontherapeutic] : patient has tried OTC's including aspirin, Ibuprofen, Aleve, etc or prescription NSAIDS, and/or exercises at home and/or physical therapy without satisfactory response [Patient had decreased mobility in the joint] : patient had decreased mobility in the joint [Risks, benefits, alternatives discussed / Verbal consent obtained] : the risks benefits, and alternatives have been discussed, and verbal consent was obtained

## 2023-12-28 NOTE — HISTORY OF PRESENT ILLNESS
[9] : 9 [Burning] : burning [Dull/Aching] : dull/aching [Throbbing] : throbbing [Constant] : constant [Household chores] : household chores [Rest] : rest [Meds] : meds [Heat] : heat [Nothing helps with pain getting better] : Nothing helps with pain getting better [Exercising] : exercising [Lying in bed] : lying in bed [Retired] : Work status: retired [] : Post Surgical Visit: no [FreeTextEntry5] : 79 y/o M presents for Euflexxa #3 for rt shoulder today. [FreeTextEntry7] : to the neck [FreeTextEntry9] : tylenol/ prednisone no relief

## 2024-01-03 ENCOUNTER — APPOINTMENT (OUTPATIENT)
Dept: PAIN MANAGEMENT | Facility: CLINIC | Age: 79
End: 2024-01-03
Payer: MEDICARE

## 2024-01-03 VITALS — WEIGHT: 205 LBS | HEIGHT: 68 IN | BODY MASS INDEX: 31.07 KG/M2

## 2024-01-03 DIAGNOSIS — M54.12 RADICULOPATHY, CERVICAL REGION: ICD-10-CM

## 2024-01-03 PROCEDURE — 99214 OFFICE O/P EST MOD 30 MIN: CPT

## 2024-01-03 PROCEDURE — 99204 OFFICE O/P NEW MOD 45 MIN: CPT

## 2024-01-03 NOTE — ASSESSMENT
[FreeTextEntry1] : Subjective findings This 78-year-old male presents to us with pain in the neck with a radiating component down both arms.  Patient says that the pain has been slowly worsening over the course of time.  Has been under the care of a neurologist who feels that the etiology of his pain is from his cervical spine and that he would benefit from cervical epidural steroid injections.  Patient denies any bowel or bladder incontinence or any progressive weakness.  The CV/Pulm/GI/Heme/Renal/Hepatic//Psych/Endo systems are reviewed. A full ROS was performed and reviewed with the patient today, see intake form.  Average pain score for the month is 6 out of ten. The patient's current medications are documented to the best of their ability. The patient has failed conservative therapies to include medical management, and physical activity to treat the pain. The patient has decreased function secondary to the pain. Objective findings Imaging studies are consistent with the patient's pain complaints.  Patient has decreased range of motion of the cervical spine but normal range of motion of his upper extremities bilaterally the rest of physical exam is as expected. Assessment Cervical radiculopathy Plan Spoke to patient about epidural steroid injections. Explained risks, benefits and alternatives including but not limited to the risk of infection, bleeding, headache, syncopal episode, failure to resolve issues, allergic reaction, symptom recurrence, allergic reaction, nerve injury, and increased pain. The patient understands the risks. All questions were answered. The patient is willing to proceed. The importance of increasing exercise after the injection is also stressed. The use of the injection as a jump start so that the patient can do more exercise, but that the exercise is the long term answer for the patient is reviewed. The patient states understanding.

## 2024-01-03 NOTE — HISTORY OF PRESENT ILLNESS
[FreeTextEntry1] : THE PATIENT IS 78 year OLD RT  HAND DOMINANT male  WHO PRESENT TODAY IN OFFICE WITH CSPINE RADATING TO RT SHOULDER TO THE FORARM WOULD LIKE TO DISS PLAN OF CARE AS PAIN IS INTERFYING WITH [facility]      DATE OF INJURY/ONSET: 2MNTH THAT PROGRESSING TO WORSE        PAIN: AT REST: 8/10       WTIH ACTIVITY: 10/10     MECHANISM OF INJURY: UNKNW INJURY        QUALITY OF SYMPTOMS:  DULL/ACHE      IMPROVES WITH:  HEAT, COLD      WORSE WITH:  SITTING, MOVING ARM, MOVING HEAD AROUND, ALL TYPE OF ACTIVITY      PRIOR TREATMENT:  NA          PRIOR IMAGIN2023 YRNINE        SCHOOL/SPORT/POSITION/OCCUPATION:       ADDITIONAL INFORMATION: 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

## 2024-01-04 ENCOUNTER — APPOINTMENT (OUTPATIENT)
Dept: ORTHOPEDIC SURGERY | Facility: HOSPITAL | Age: 79
End: 2024-01-04
Payer: MEDICARE

## 2024-01-04 ENCOUNTER — TRANSCRIPTION ENCOUNTER (OUTPATIENT)
Age: 79
End: 2024-01-04

## 2024-01-04 ENCOUNTER — OUTPATIENT (OUTPATIENT)
Dept: OUTPATIENT SERVICES | Facility: HOSPITAL | Age: 79
LOS: 1 days | End: 2024-01-04
Payer: MEDICARE

## 2024-01-04 VITALS
DIASTOLIC BLOOD PRESSURE: 43 MMHG | HEART RATE: 68 BPM | SYSTOLIC BLOOD PRESSURE: 104 MMHG | OXYGEN SATURATION: 99 % | RESPIRATION RATE: 20 BRPM

## 2024-01-04 VITALS
DIASTOLIC BLOOD PRESSURE: 65 MMHG | TEMPERATURE: 98 F | OXYGEN SATURATION: 100 % | WEIGHT: 210.1 LBS | HEIGHT: 68 IN | RESPIRATION RATE: 10 BRPM | HEART RATE: 68 BPM | SYSTOLIC BLOOD PRESSURE: 134 MMHG

## 2024-01-04 DIAGNOSIS — Z98.1 ARTHRODESIS STATUS: Chronic | ICD-10-CM

## 2024-01-04 DIAGNOSIS — Z98.89 OTHER SPECIFIED POSTPROCEDURAL STATES: Chronic | ICD-10-CM

## 2024-01-04 DIAGNOSIS — M54.12 RADICULOPATHY, CERVICAL REGION: ICD-10-CM

## 2024-01-04 LAB
GLUCOSE BLDC GLUCOMTR-MCNC: 166 MG/DL — HIGH (ref 70–99)
GLUCOSE BLDC GLUCOMTR-MCNC: 166 MG/DL — HIGH (ref 70–99)

## 2024-01-04 PROCEDURE — 62323 NJX INTERLAMINAR LMBR/SAC: CPT

## 2024-01-04 PROCEDURE — 62321 NJX INTERLAMINAR CRV/THRC: CPT

## 2024-01-04 PROCEDURE — 82962 GLUCOSE BLOOD TEST: CPT

## 2024-01-04 RX ORDER — ACETAMINOPHEN 500 MG
2 TABLET ORAL
Qty: 0 | Refills: 0 | DISCHARGE

## 2024-01-04 NOTE — ASU PATIENT PROFILE, ADULT - FALL HARM RISK - UNIVERSAL INTERVENTIONS
Bed in lowest position, wheels locked, appropriate side rails in place/Call bell, personal items and telephone in reach/Instruct patient to call for assistance before getting out of bed or chair/Non-slip footwear when patient is out of bed/[place] to call system/Physically safe environment - no spills, clutter or unnecessary equipment/Purposeful Proactive Rounding/Room/bathroom lighting operational, light cord in reach Bed in lowest position, wheels locked, appropriate side rails in place/Call bell, personal items and telephone in reach/Instruct patient to call for assistance before getting out of bed or chair/Non-slip footwear when patient is out of bed/[place] to call system/Physically safe environment - no spills, clutter or unnecessary equipment/Purposeful Proactive Rounding/Room/bathroom lighting operational, light cord in reach

## 2024-01-04 NOTE — ASU DISCHARGE PLAN (ADULT/PEDIATRIC) - NS MD DC FALL RISK RISK
For information on Fall & Injury Prevention, visit: [email or web address].[email or web address]/[email or web address]-prevention-[email or web address]-and-maintains-health-and-mobility OR  https://www.[facility]/news/fall-prevention-tips-[email or web address]-injury OR  https://www.cdc.gov/steadi/patient.html For information on Fall & Injury Prevention, visit: [email or web address]://www.[email or web address]/news/fall-prevention-protects-and-maintains-health-and-mobility OR  https://www.[facility]/news/fall-prevention-tips-to-avoid-injury OR  https://www.cdc.gov/steadi/patient.html

## 2024-01-04 NOTE — ASU PATIENT PROFILE, ADULT - NS PRO ABUSE SCREEN SUSPICION NEGLECT YN
Hospitalist Discharge Summary     Patient ID:  Fina Wu  [id number]  46 y.o.  1966    PCP on record: None    Admit date: 5/6/2017  Discharge date and time: 5/13/2017      DISCHARGE DIAGNOSIS:  Acute hypoxic respiratory failure   Acute Pulmonary Edema in settings of ESRD   CP/elevated Troponin  Hypertensive Urgency POA  Leukocytosis   Anemia ESRD  Tobacco Abuse   Substance abuse (heroin)      CONSULTATIONS:  IP CONSULT TO CARDIOLOGY  IP CONSULT TO NEPHROLOGY    Excerpted HPI from H&P of Agustín Bernabe MD:  Bj Flores is a 46 y.o.  male who presents with above complaint. History is limited due to pt currently on BIPAP. Pt with h/o ESRD on HD every M/W/F. His last HD was yesterday. He started with SOB suddenly today. Pt presented to [facility] today. Per ED notes, pt was walking down the street and walked up to the ER entrance and \"fell out\". Pt collapsed outside. Charge nurse and tech brought wheelchair to bring pt in. On presentation to ED pt was extremely dyspneic. He was diaphoretic. He wasusing accessory muscles. O2 saturation on arrival was at low 80th. Pt was placed on NRB at [facility]. He was transferred to [facility] for higher level of care. In ED here he was placed on BIPAP. He admits to chest pain but cannot provide any details.        ______________________________________________________________________  DISCHARGE SUMMARY/HOSPITAL COURSE:  for full details see H&P, daily progress notes, labs, consult notes. 46year old male admitted to the hospital shortness of breath. He was released from [facility] on 4/10/2017, has been visiting ER three times since the release. We confirmed that patient's HD has been set at [facility] dialysis center on Mondays, Wednesdays, and Fridays. Patient was treated with hypoxic respiratory failure due to acute pulmonary edema secondary to not following dietary guidelines (too much fluid intake) and not compliant with ESRD.   We discussed about importance of taking medications as directed, following dietary recommendation, and not to skipping HD session. His pulmonary edema has completely resolved per this morning CXR. Patient was requesting pain medication to treat generalized discomfort. Discussed patient that he must follow up with pcp to discuss his concerns. Patient may continue to take ultram as need. We were not able to justify with providing opiates for this patient. Patient is able to ambulate the room without any assistance and able to perform full active ROM without difficulty. RX provided for all d/c medications. Patient claimed that he was currently out of all the medications. The patient is medically stable to discharge to home. Below are the detail medical diagnosis that patient was treated it during this hospitalization;    Acute hypoxic respiratory failure   due to Acute Pulmonary Edema in settings of ESRD   Recurrent pulmonary edema even in the hospital  Due to non compliant with fluid intake (he admitted drinking plenty of fluid despite recurrent counseling)  Pt kept requesting to keep him in hospital as long it can be, concern self inducing recurrent pulmonary edema with excessive PO intake  Discussed about importance following dietary recommendation. Repeat CXR revealed Resolved pulmonary edema  Remind pt about importance of following up with scheduled HD sessions.     CP/elevated Troponin - Troponin Peaked to 5.53  Followed by the Cardiology. Normal stress nuclear test 5/11; discussed about not using any illegal substances such as heroin. Patient refused to participate perfusion part of stress testing. O2 Sat 98% in room air.   2D echo with normal ED  CTA negative for PE  Unable to give ASA due to NSAID allergy  Pain management with tramadol only     Hypertensive Urgency POA  Due to volume overload  Continue w/ BB and lasix     Leukocytosis   Likely stress related, resolved, WBC 8.9      Anemia ESRD  Hb stable at 8  EPO and transfusion per nephrology with HD     Tobacco Abuse   Substance Abuse (heroin)  Discussed about importance of smoking cessation and affects of heroin to his heart. Pt replied as \"I know. \"      _______________________________________________________________________  Patient seen and examined by me on discharge day. Pertinent Findings:  Gen:    Not in distress  Chest: Clear lungs  CVS:   Regular rhythm. No edema  Abd:  Soft, not distended, not tender  Neuro:  Alert, orient x 4  _______________________________________________________________________  DISCHARGE MEDICATIONS:   Current Discharge Medication List      START taking these medications    Details   carvedilol (COREG) 12.5 mg tablet Take 1 Tab by mouth two (2) times daily (with meals). Qty: 60 Tab, Refills: 0      !! gabapentin (NEURONTIN) 100 mg capsule Take 1 Cap by mouth two (2) times a day. Qty: 60 Cap, Refills: 0      sevelamer (RENAGEL) 800 mg tablet Take 1 Tab by mouth three (3) times daily. Qty: 90 Tab, Refills: 0       !! - Potential duplicate medications found. Please discuss with provider. CONTINUE these medications which have CHANGED    Details   furosemide (LASIX) 80 mg tablet Take 1 Tab by mouth daily. Qty: 30 Tab, Refills: 0      terazosin (HYTRIN) 2 mg capsule Take 1 Cap by mouth nightly. Qty: 30 Cap, Refills: 0      !! allopurinol (ZYLOPRIM) 100 mg tablet Take 1 Tab by mouth daily. Qty: 30 Tab, Refills: 0      !! amLODIPine (NORVASC) 10 mg tablet Take 1 Tab by mouth daily. Qty: 30 Tab, Refills: 0      !! cloNIDine HCl (CATAPRES) 0.3 mg tablet Take 1 Tab by mouth two (2) times a day. Qty: 60 Tab, Refills: 0      !! hydrALAZINE (APRESOLINE) 50 mg tablet Take 1 Tab by mouth three (3) times daily. Qty: 90 Tab, Refills: 0      !! traMADol (ULTRAM) 50 mg tablet Take 2 Tabs by mouth every six (6) hours as needed. Max Daily Amount: 400 mg. Qty: 6 Tab, Refills: 0       !! - Potential duplicate medications found.  Please discuss with provider. CONTINUE these medications which have NOT CHANGED    Details   multivitamin (ONE A DAY) tablet Take 1 Tab by mouth daily. pantoprazole (PROTONIX) 40 mg tablet Take 40 mg by mouth daily. paricalcitol (ZEMPLAR) 1 mcg capsule Take 1 mcg by mouth daily. !! hydrALAZINE (APRESOLINE) 50 mg tablet Take 50 mg by mouth three (3) times daily. sevelamer carbonate (RENVELA) 800 mg tab tab Take 800 mg by mouth three (3) times daily. FOLIC ACID/VIT B COMPLEX AND C (FULL SPECTRUM B-VITAMIN C PO) Take  by mouth. !! amLODIPine (NORVASC) 10 mg tablet Take 10 mg by mouth daily. !! cloNIDine HCl (CATAPRES) 0.3 mg tablet Take 0.3 mg by mouth two (2) times a day. !! allopurinol (ZYLOPRIM) 100 mg tablet Take  by mouth daily. !! TRAMADOL HCL (ULTRAM PO) Take 50 mg by mouth two (2) times a day. !! GABAPENTIN PO Take 300 mg by mouth two (2) times a day. !! - Potential duplicate medications found. Please discuss with provider. STOP taking these medications       propranolol (INDERAL) 20 mg tablet Comments:   Reason for Stopping:         sodium bicarbonate 650 mg tablet Comments:   Reason for Stopping:               My Recommended Diet, Activity, Wound Care, and follow-up labs are listed in the patient's Discharge Insturctions which I have personally completed and reviewed.     _______________________________________________________________________  DISPOSITION:    Home with Family: y   Home with HH/PT/OT/RN:    SNF/LTC:    LEO:    OTHER:        Condition at Discharge:  Stable  _______________________________________________________________________  Follow up with:   PCP : None  Follow-up Information     Follow up With Details Comments Contact Info    Hugh Drake MD Schedule an appointment as soon as possible for a visit 3-4 weeks to be seen [address]  [address] [facility]  [phone]      None   None ([phone]) Patient stated that they have no PCP          [facility]  [phone]  Fax - [phone] [address]ADMIN[address] [place], [address] [facility]     Confirmed with Katie Bryan RN - MWF HD 1st shift      Total time in minutes spent coordinating this discharge (includes going over instructions, follow-up, prescriptions, and preparing report for sign off to her PCP) :  35 minutes    Signed:  Azul Niño NP no

## 2024-01-04 NOTE — ASU PATIENT PROFILE, ADULT - NSICDXPASTMEDICALHX_GEN_ALL_CORE_FT
PAST MEDICAL HISTORY:  Chronic pain lumbar, left hip    Diabetes type 2, controlled     Gout     [place] (hard of hearing), bilateral b/l hearing aids    HTN (hypertension)     Hypercholesteremia     Meniscus tear     Seizure disorder on meds last seizure 2011    Septicemia Pt states he had spsis 2010 and was in hospital for 40 days, sent home on IV antibiotics    Spinal stenosis of thoracic region and lumbar region    Tennis elbow     Thoracic spondylosis      PAST MEDICAL HISTORY:  Chronic pain lumbar, left hip    Diabetes type 2, controlled     Gout     [place] (hard of hearing), bilateral b/l hearing aids    HTN (hypertension)     Hypercholesteremia     Meniscus tear     Seizure disorder on meds last seizure 2011    Septicemia Pt states he had spsis 2010 and was in hospital for 40 days, sent home on IV antibiotics    Spinal stenosis of thoracic region and lumbar region    Tennis elbow     Thoracic spondylosis

## 2024-01-29 NOTE — ED PROVIDER NOTE - DOMESTIC TRAVEL HIGH RISK QUESTION
Writer vicki suarez asking for an appointment, writer félix asking pt to call back to schedule the type of appointment she needs.    No

## 2024-02-08 ENCOUNTER — APPOINTMENT (OUTPATIENT)
Dept: MRI IMAGING | Facility: CLINIC | Age: 79
End: 2024-02-08
Payer: MEDICARE

## 2024-02-08 ENCOUNTER — OUTPATIENT (OUTPATIENT)
Dept: OUTPATIENT SERVICES | Facility: HOSPITAL | Age: 79
LOS: 1 days | End: 2024-02-08
Payer: MEDICARE

## 2024-02-08 DIAGNOSIS — Z00.00 ENCOUNTER FOR GENERAL ADULT MEDICAL EXAMINATION WITHOUT ABNORMAL FINDINGS: ICD-10-CM

## 2024-02-08 DIAGNOSIS — Z98.89 OTHER SPECIFIED POSTPROCEDURAL STATES: Chronic | ICD-10-CM

## 2024-02-08 DIAGNOSIS — Z87.898 PERSONAL HISTORY OF OTHER SPECIFIED CONDITIONS: Chronic | ICD-10-CM

## 2024-02-08 DIAGNOSIS — Z98.1 ARTHRODESIS STATUS: Chronic | ICD-10-CM

## 2024-02-08 PROCEDURE — 72195 MRI PELVIS W/O DYE: CPT | Mod: MH

## 2024-02-08 PROCEDURE — 72195 MRI PELVIS W/O DYE: CPT | Mod: 26,MH

## 2024-02-10 ENCOUNTER — APPOINTMENT (OUTPATIENT)
Dept: RADIOLOGY | Facility: HOSPITAL | Age: 79
End: 2024-02-10
Payer: MEDICARE

## 2024-02-10 ENCOUNTER — OUTPATIENT (OUTPATIENT)
Dept: OUTPATIENT SERVICES | Facility: HOSPITAL | Age: 79
LOS: 1 days | End: 2024-02-10
Payer: MEDICARE

## 2024-02-10 DIAGNOSIS — Z98.1 ARTHRODESIS STATUS: Chronic | ICD-10-CM

## 2024-02-10 DIAGNOSIS — Z98.89 OTHER SPECIFIED POSTPROCEDURAL STATES: Chronic | ICD-10-CM

## 2024-02-10 DIAGNOSIS — Z00.8 ENCOUNTER FOR OTHER GENERAL EXAMINATION: ICD-10-CM

## 2024-02-10 PROCEDURE — 77080 DXA BONE DENSITY AXIAL: CPT | Mod: 26

## 2024-02-10 PROCEDURE — 77080 DXA BONE DENSITY AXIAL: CPT

## 2024-04-10 ENCOUNTER — LABORATORY RESULT (OUTPATIENT)
Age: 79
End: 2024-04-10

## 2024-04-10 ENCOUNTER — OUTPATIENT (OUTPATIENT)
Dept: OUTPATIENT SERVICES | Facility: HOSPITAL | Age: 79
LOS: 1 days | Discharge: HOME | End: 2024-04-10

## 2024-04-10 DIAGNOSIS — D64.9 ANEMIA, UNSPECIFIED: ICD-10-CM

## 2024-04-10 DIAGNOSIS — Z98.89 OTHER SPECIFIED POSTPROCEDURAL STATES: Chronic | ICD-10-CM

## 2024-04-10 DIAGNOSIS — Z98.1 ARTHRODESIS STATUS: Chronic | ICD-10-CM

## 2024-04-10 LAB
BASOPHILS # BLD AUTO: 0.05 K/UL
BASOPHILS NFR BLD AUTO: 0.9 %
CALCIUM SERPL-MCNC: 10.1 MG/DL
EOSINOPHIL # BLD AUTO: 0.22 K/UL
EOSINOPHIL NFR BLD AUTO: 4 %
HCT VFR BLD CALC: 36.8 %
HGB BLD-MCNC: 12.2 G/DL
IMM GRANULOCYTES NFR BLD AUTO: 0.7 %
LYMPHOCYTES # BLD AUTO: 0.85 K/UL
LYMPHOCYTES NFR BLD AUTO: 15.6 %
MAN DIFF?: NORMAL
MCHC RBC-ENTMCNC: 33.2 GM/DL
MCHC RBC-ENTMCNC: 34.4 PG
MCV RBC AUTO: 103.7 FL
MONOCYTES # BLD AUTO: 0.74 K/UL
MONOCYTES NFR BLD AUTO: 13.6 %
NEUTROPHILS # BLD AUTO: 3.55 K/UL
NEUTROPHILS NFR BLD AUTO: 65.2 %
PLATELET # BLD AUTO: 209 K/UL
RBC # BLD: 3.55 M/UL
RBC # FLD: 14.5 %
WBC # FLD AUTO: 5.45 K/UL

## 2024-04-11 LAB
CREAT SERPL-MCNC: 1.57 MG/DL
DEPRECATED KAPPA LC FREE/LAMBDA SER: 1.39 RATIO
EGFR: 45 ML/MIN/1.73M2
IGA SER QL IEP: 315 MG/DL
IGG SER QL IEP: 613 MG/DL
IGM SER QL IEP: 80 MG/DL
KAPPA LC CSF-MCNC: 2.33 MG/DL
KAPPA LC SERPL-MCNC: 3.25 MG/DL

## 2024-04-12 LAB
ALBUMIN MFR SERPL ELPH: 60.9 %
ALBUMIN SERPL-MCNC: 4 G/DL
ALBUMIN/GLOB SERPL: 1.5 RATIO
ALPHA1 GLOB MFR SERPL ELPH: 3.5 %
ALPHA1 GLOB SERPL ELPH-MCNC: 0.2 G/DL
ALPHA2 GLOB MFR SERPL ELPH: 12.5 %
ALPHA2 GLOB SERPL ELPH-MCNC: 0.8 G/DL
B-GLOBULIN MFR SERPL ELPH: 11.2 %
B-GLOBULIN SERPL ELPH-MCNC: 0.7 G/DL
GAMMA GLOB FLD ELPH-MCNC: 0.8 G/DL
GAMMA GLOB MFR SERPL ELPH: 11.9 %
INTERPRETATION SERPL IEP-IMP: NORMAL
M PROTEIN MFR SERPL ELPH: 2.5 %
MONOCLON BAND OBS SERPL: 0.2 G/DL
PROT SERPL-MCNC: 6.6 G/DL
PROT SERPL-MCNC: 6.6 G/DL

## 2024-04-13 PROBLEM — D47.2 IGA MONOCLONAL GAMMOPATHY: Status: ACTIVE | Noted: 2021-07-07

## 2024-04-18 ENCOUNTER — APPOINTMENT (OUTPATIENT)
Dept: HEMATOLOGY ONCOLOGY | Facility: CLINIC | Age: 79
End: 2024-04-18
Payer: MEDICARE

## 2024-04-18 VITALS
OXYGEN SATURATION: 98 % | HEART RATE: 75 BPM | SYSTOLIC BLOOD PRESSURE: 108 MMHG | RESPIRATION RATE: 16 BRPM | TEMPERATURE: 98 F | HEIGHT: 67.99 IN | BODY MASS INDEX: 32.41 KG/M2 | WEIGHT: 213.85 LBS | DIASTOLIC BLOOD PRESSURE: 65 MMHG

## 2024-04-18 DIAGNOSIS — D47.2 MONOCLONAL GAMMOPATHY: ICD-10-CM

## 2024-04-18 PROCEDURE — G2211 COMPLEX E/M VISIT ADD ON: CPT

## 2024-04-18 PROCEDURE — 99213 OFFICE O/P EST LOW 20 MIN: CPT

## 2024-04-18 RX ORDER — PREDNISONE 20 MG/1
20 TABLET ORAL
Qty: 20 | Refills: 1 | Status: DISCONTINUED | COMMUNITY
Start: 2023-11-28 | End: 2024-04-18

## 2024-04-18 NOTE — HISTORY OF PRESENT ILLNESS
[de-identified] : 7/2021-Patient presented at the request of his neurologist for an abnormal serum immunofixation revealing 2-weak IgA kappa bands.  He had been undergoing a neurologic evaluation for a 6-month history of imbalance and unsteady gait.  Dr. Craig felt his neurological exam remained consistent with spinal deformity having had prior surgery and peripheral neuropathy. Suspected a multifactorial gait disorder.\par  \par  8/17/21-Bone marrow biopsy and bone marrow aspirate showed monotypic plasma cells involving 2-3% of cellularity.  Cytogenetics with normal male karyotype.  Normal myeloma FISH panel.\par  \par  \par  \par   [de-identified] : Getting RT for prostate cancer.-completes 45 treatments next Friday. Last Lupron 2 weeks ago per ROLANDO CAMPOS. Saw PCP in f/u for renal insufficiency-told to continue current meds. Has lab work q 6 months.   [facility] (had a rising PSA). Dr. Coon (), Dr. Griffin (med.onc.) and Dr. Ruiz (rad. onc) are the managing physicians. No current pulmonary, GI complaints. No fevers. No h/o bleeding. No LN complaints.

## 2024-04-18 NOTE — PHYSICAL EXAM
[Normal] : affect appropriate [de-identified] : breathing appeared unlabored [de-identified] : A &O x 3

## 2024-04-18 NOTE — ASSESSMENT
[FreeTextEntry1] : Lab work reviewed. 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 Cytogenetics with normal male karyotype. Normal myeloma FISH panel. 7/12/2021-Skeletal survey-tiny superior calvarial lucencies thought to most likely represent venous lakes or pacchionian granulations rather than myelomatous lesions. No additional suspicious lytic lesions on remainder of the survey. --Currently c/w MGUS. Reviewed potential complications if a plasma cell disorder progresses. --Clinically stable currently. Hematologic surveillance.  #2) to have short interval repeat renal profile for elevated Cr, and continue f/u with PCP for this.  Patient was given the opportunity to ask questions. His questions have been answered to his apparent satisfaction. He expressed his understanding and willingness to comply with recommended follow-up.   -->RTO 6 months.

## 2024-05-02 ENCOUNTER — APPOINTMENT (OUTPATIENT)
Dept: ORTHOPEDIC SURGERY | Facility: CLINIC | Age: 79
End: 2024-05-02
Payer: MEDICARE

## 2024-05-02 VITALS — WEIGHT: 213 LBS | BODY MASS INDEX: 32.28 KG/M2 | HEIGHT: 67.99 IN

## 2024-05-02 PROCEDURE — 72070 X-RAY EXAM THORAC SPINE 2VWS: CPT

## 2024-05-02 PROCEDURE — 72100 X-RAY EXAM L-S SPINE 2/3 VWS: CPT

## 2024-05-02 PROCEDURE — 99215 OFFICE O/P EST HI 40 MIN: CPT

## 2024-05-02 NOTE — DISCUSSION/SUMMARY
[de-identified] : 79 yo male with Thoracic stenosis, lumbar stenosis, failed conservative care, unable to walk more then one block, recommend MRI of T and L Spine with and without contrast, CT of T and L Spine. RTO for surgical intervention.   Diagnosis, prognosis, natural history and treatment was discussed with patient. Patient was advised if the following symptoms develop: chills, fever,  loss of bladder control, bowel incontinence or urinary retention, numbness/tingling or weakness is present in upper or lower extremities, to go to the nearest emergency room. This may be a new clinical condition not present at the time of the patient visit  that may lead to paralysis and/or death, Patient advised if the above symptoms developed to also call the office immediately to inform us and to go to the nearest emergency room.

## 2024-05-02 NOTE — HISTORY OF PRESENT ILLNESS
[Pain] : pain [Stable] : stable [6] : currently ~his/her~ pain is 6 out of 10 [Constant] : ~He/She~ states the symptoms seem to be constant [Bending] : worsened by bending [Walking] : worsened by walking [None] : No relieving factors are noted [All Other ROS Normal] : All other review of systems are negative except as noted [Joint Pain] : joint pain [All Hx] : past medical, family, and social [All] : medication and allergy [de-identified] : 77 yo male s/p exploration spinal fusion L3-L5, instrumentation T10-L2, posterolateral fusion T10-L3 on 03/04/2015 with cervical spondylosis here for annual follow up. Patient presents today for follow-up visit c/o acute on chronic lower back pain radiating down both legs over , with numbness and tingling. Patient denies any recent falls, trauma, or other injuries. Patient states that pain is localized to left lower back, radiating down his thighs bilaterally, now associated with numbness and tingling calves and ankles. Patient any lower extremity weakness. Patient is currently following up with Dr. Prajapati for pain management. He had 2-3 AMALIA's which didn't provide any relief, most recently about 1-2 months ago. Patient currently not taking any pain medications. Completed PT previously.   Pt denies fever, chills, weight changes, loss of bladder control, bowel incontinence or urinary retention or saddle anesthesia The patient's past medical history, past surgical history, medications, allergies, and social history were reviewed by me today with the patient and documented accordingly. In addition, the patient's family history, which is noncontributory to this visit, was also reviewed.  [Acupuncture] : not relieved by acupuncture [Ataxia] : no ataxia [Incontinence] : no incontinence [Loss of Dexterity] : good dexterity [Urinary Ret.] : no urinary retention [Joint Swelling] : no joint swelling [FreeTextEntry1] : exp. spinal fusion L3-L5, ins T10-L2, posterolateral fusion T10-L3 03/04/2015; right scapula pain  [FreeTextEntry2] : 71 yo male s/p exploration spinal fusion L3-L5, instrumentation T10-L2, posterolateral fusion T10-L3 on 03/04/2015 with cervical spondylosis here for annual follow up. He reports right sided scapula pain. Pain radiates down the right arm to elbow. Denies numbness, tingling and weakness. The patient has been seeing Dr. Prajapati for pain management. He had 2-3 AMALIA's which didn't provide any relief. He received acupuncture care which didn't help. Lumbar symptoms are stable. Denies any leg pain. He is not currently taking any pain medications for it. Completed PT previously.  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

## 2024-05-02 NOTE — PHYSICAL EXAM
[Normal] : normal [Limited] : is limited [UE/LE] : Sensory: Intact in bilateral upper & lower extremities [0] : left ankle jerk 0 [ALL] : dorsalis pedis, posterior tibial, femoral, popliteal, and radial 2+ and symmetric bilaterally [Poor Appearance] : well-appearing [Acute Distress] : not in acute distress [Obese] : not obese [Abl Mood] : in a normal mood [Abl Affect] : with normal affect [Poor Coordination] : normal coordination [Disorientation] : oriented x 3 [Painful] : not painful [Valladares's Sign] : negative Valladares's sign [SLR] : negative straight leg raise [DTR Reflexes Clonus Of Right Ankle (___ Beats)] : absent on the right [DTR Reflexes Clonus Of Left Ankle (___ Beats)] : absent on the left [FreeTextEntry2] : TTP right paraspinals over scapula\par  NTTP L spine and b/l paraspinals L region. \par  Skin intact C/L spine and all 4 extremities. No rashes, ulcers, blisters. \par  No lymphedema. \par  Posterior T/L incision well healed. \par   [de-identified] : Cervical, Lumbar [de-identified] : X-Ray C/T/L spine - 11/10/2016 - cervical spondylosis, TL spinal fusion [de-identified] : Lumbar ROM: NTTP L spine and b/l paraspinals lumbar region Skin intact L spine. No rashes, ulcers, blisters.  No lymphedema.  Rapid alternating movements- intact Finger to nose testing- intact Full and non-painful ROM RUE, LUE, RLE and LLE. Skin intact RUE, LUE, RLE and LLE. No rashes, blisters, ulcers. NTTP RUE, LUE, RLE and LLE. No evidence of dislocation or subluxation B/L upper and lower extremities.

## 2024-05-02 NOTE — REASON FOR VISIT
PT DISCHARGE NOTE FOR OUTPATIENT THERAPY    Patient: Ramirez Montero MRN: [id number]  : 1978 43 y.o.  Referring Physician: Sanya Juarez Jr., *  Date of Visit: 2022      Certification Dates: 10/25/22 through 22    Total Visit Count: 17    Chief Complaints:  Chief Complaint   Patient presents with    Other       Precautions:  Precautions comments: Hx of DM  Existing Precautions/Restrictions: no known precautions/restrictions     TODAY'S VISIT:    Time In Session:  Start Time: 1500  Stop Time: 1528  Time Calculation (min): 28 min   General Information - 22 1458        Session Details    Document Type discharge evaluation     Mode of Treatment individual therapy;physical therapy     Patient/Family/Caregiver Comments/Observations Patient presents wearing Elvarex stockings.  Denies issues with daily wear.  Overall comfortable and no issues donning/doffing.  He has not doubled them up to date and is still waiting second L garment.        General Information    Onset of Illness/Injury or Date of Surgery 22     Referring Physician Dr. Juarez, DPM     History of present illness/functional impairment Patient presents with c/o B LE edema that has become more evident since his gastric bypass surgery in 2022.  He has since lost 100 lbs.  He recently saw his podiatrist who recommend consultation with a CLT.  Patient reports his edema has been there for a few years and is isolated to below the knees.  He states that his edema is effecting his work (works for [employer] long shifts) as his legs get hard and painful when he stands for a long time.  Tushar does state that his legs look best first thing in the morning or when he has a day off and can elevate his legs.  He has never used compression socks to date.     Existing Precautions/Restrictions no known precautions/restrictions     Precautions comments Hx of DM                Daily Falls Screen - 22 5810        Daily Falls Assessment     Patient reported fall since last visit No                Pain/Vitals - 11/22/22 3968        Pain Assessment    Currently in pain No/Denies     Pain Rating (0-10): Pre Activity 0        Pain Intervention    Intervention  N/A     Post Intervention Comments N/A                PT - 11/22/22 0543        Physical Therapy    Physical Therapy Specialty Lymphedema Program        PT Plan    Frequency of treatment 2 times/week     PT Duration 4 weeks     PT Cert From 10/25/22     PT Cert To 11/22/22     Date PT POC was sent to provider 10/25/22     Signed PT Plan of Care received?  No   POC refaxed 9/27.               Assessment and Plan - 11/22/22 6768        Assessment    Plan of Care reviewed and patient/family in agreement Yes     Clinical Assessment Patient has met all PT goals at this time and is Ind with use of compression strategies including Elvarex custom knee highs and Farrow Basic Leg Wraps.  He demonstrates well maintained volume reduction and improved skin quality and texture. Patient educated that garment will need to be refitted/replaced in 6 mos or possibly sooner as he continues to progress on his weight loss journey.  Patient verbalizes understanding.  He has met his goals and is Ind with self care - DC to self care at this time.     Plan and Recommendations DC PT to self care.                 OBJECTIVE MEASUREMENTS/DATA:    Lymphedema     Lymphedema Assessment - 11/22/22 1500        Skin    Skin Condition Intact     Odor comments None     Sensation comments Intact LT     Other comments Dry/flaky skin in areas although patient is compliant with use of moisturizer several times a day - did not apply before PT today.        Palpation    LE Palpation  Soft, non pitting edema B lower legs, ankles and feet; non tender        Outcome Measures    LLIS results/comments LLIS 2 = 9        Affected LE Measurements    Affected limb laterality Right     MTP 25.8 cm     -8 cm 29.2 cm     0 cm 37 cm     4 cm 49.9 cm      8 cm 53 cm     12 cm 57.5 cm     16 cm 61.4 cm     20 cm 65.3 cm     24 cm 65.5 cm     28 cm 64 cm     32 cm 63 cm     Affected LE Total Volume (ml) 525863.29 ml        Other Affected LE Circumference    Unaffected limb laterality Left     MTP 26.5 cm     -8 cm 29.2 cm     0 cm 36.8 cm     4 cm 49.5 cm     8 cm 55.8 cm     12 cm 62.8 cm     16 cm 68 cm     20 cm 71.5 cm     24 cm 72 cm     28 cm 72.6 cm     32 cm 65.8 cm     Other Affected LE Total Volume (ml) 209071.36 ml        Garments    Day use Elvarex/Farrow Wraps                       Today's Treatment:    Education provided:  Yes: See treatment log for details of education provided    ..LYMPHEDEMA PT FLOWSHEET    PT Lymph Exercises Current Session Time   MODALITIES TOTAL TIME FOR SESSION Not performed   Heat (CPT 91949)    Vasocompression/Ice (CPT 67127)3    Estim/H-Wave (CPT 34048)    Mechanical Traction (CPT 34974)    THER ACT  CPT 58024 TOTAL TIME FOR SESSION 0-7 Minutes   Bed Mobility    Transfer Training    Body Mechanics/Work Training    Patient Education See below    NT - Patient education on new wear schedule with B LE flat knit garments - patient to wear daily over next week.  He can double up as tolerated/needed or switch out for his Farrow Wraps on occasion if needed.  Patient verbalizes understanding of wear schedule, laundering/hygiene practices.      NT - Patient edu to initiate Farrow use at night in addition to daytime use as tolerated.    NT - Patient education on Farrow wrap wear schedule starting with 2-3 hours during the day.  Provided with additional cotton stockinette base layers as Farrow stockinette a little snug at the top and at ankle crease.    NT - Patient education on compression options including pros and cons: compression bandaging versus Circaid reduction kit for treatment phase.  Patient elects to trial reduction kit and start with L LE.   THER EX  CPT 77924 TOTAL TIME FOR SESSION 8-22 Minutes   CARDIOVASCULAR    Nu Step L5 x  20 minutes with flat knit compression garments donned to B LEs   UBE    STRENGTHENING     Supine Ther-Ex    Standing Ther-Ex    Seated Ther-Ex    STRETCHING    Core Stabilization    LE Stretching    UE Stretching    Spinal Stretching    Postural     REPEATED MOVEMENTS    NEURO RE-ED  CPT 17036 TOTAL TIME FOR SESSION Not performed   COORDINATION    POSTURAL RE-ED    PRE-GAIT ACTIVITIES    BALANCE TRAINING    Sitting Balance    Standing Balance    KINESIOTAPE    GAIT TRAINING  CPT 76894 TOTAL TIME FOR SESSION Not performed   Ambulation     Dynamic Gait    MANUAL   CPT 55340 TOTAL TIME FOR SESSION 23-37 Minutes   Stretching    Mobilization    Massage- Deep Tissue, Scar, Transverse Friction    Manual Lymph Drainage NT - MLD L LE sequence starting with groin prep and full L LE drainage sequence   Skin Care- Lotion/Wrapping    Measurement/Fitting Patient fit check for custom knee high Elvarex Cl 3F for B LE and B LE volumetrics taken to assess garment efficacy.  Reviewed compression daytime daily use and rotating Farrow wraps in regularly for added limb volume maintenance. Patient edu on option to double up Elvarex garments as tolerated as well.    NT - Fit assessment of new Farrow lower leg wrap for R LE with garment donned following MLD.    NT - Fit assess of Farrow basic leg wrap XL - garment is of good fit to L LE.  Ankle/foot wrap is too big (sent a XL long) and will request exchange.      NT - Trialed Tubigrip size G to L lower leg/foot as interim compression post MLD while waiting on garment.     Circaid reduction kit at ; insurance will only cover Farrow wrap so ordered via [facility].   Skin Stretching/Mobilization for [place]      GROUP  CPT 99560 TOTAL TIME FOR SESSION Not performed                     Goals Addressed                 This Visit's Progress     Mutually agreed upon pain goal        Mutually agreed upon pain goal: Patient will have reduced LE pain and heaviness during work shifts 12 hours or more for  1 week to 2/10 max.    MET       PT Lymph Goals        Patient will be able to teach back 3 skin care practices for infection reduction risk. Short Term 3 weeks Met   Patient will be able to tolerate L LE compression as recommended for 10 hours + a day. Short Term 3 weeks Met   Patient will demonstrate a L LE volume reduction of at least 10 %. Short Term 4 weeks Met   Patient will have reduced R LE volume by 5% or more Short Term 6 weeks Met   Patient will be Ind with skin care practices in order to reduce infection risk of B LE and avoid hospitalizations for cellulits. Long Term 8 weeks Met   Patient will be Ind with use of appropriate compression strategies to maintain B LE limb status and avoid progression of lymphedema to next stage. Long Term 8 weeks Met   Patient will have improved LLIS score of 20% impairment or better. Long Term 8 weeks Met               Discharge information for CARF:        [Follow-Up Visit] : a follow-up visit for [Back Pain] : back pain

## 2024-05-07 ENCOUNTER — TRANSCRIPTION ENCOUNTER (OUTPATIENT)
Age: 79
End: 2024-05-07

## 2024-05-07 NOTE — ASU PATIENT PROFILE, ADULT - AS SC BRADEN ACTIVITY
Websites with free information:    [employer] patient website: www.voicesforpfd.org    Total Control Program: www.totalcontrolprogram.com    Supplements to prevent UTI to consider  -Probiotics  -Cranberry (for these products let them know a doctor is recommending them)   Ellura: www.myellura.Total-trax   Theracran HP by [employer] [facility] [address]  -d-mannose  -Vitamin C 500-1000mg twice a day    When you finish the gentamicin then you can stop the irrigations    You can do the daily antibiotics again after you finish the current regimen    It was a pleasure meeting with you today.  Thank you for allowing me and my team the privilege of caring for you today.  YOU are the reason we are here, and I truly hope we provided you with the excellent service you deserve.  Please let us know if there is anything else we can do for you so that we can be sure you are leaving completely satisfied with your care experience.          
(4) walks frequently

## 2024-05-07 NOTE — ASU PATIENT PROFILE, ADULT - NSICDXPASTSURGICALHX_GEN_ALL_CORE_FT
PAST SURGICAL HISTORY:  H/O cardiac catheterization 2010 c/o tightness chest , cardiac workup normal    History of tennis elbow s/p surgery b/l    S/P knee surgery b/l    S/P lumbar fusion 2009     PAST SURGICAL HISTORY:  H/O cardiac catheterization 2010 c/o tightness chest , cardiac workup normal    H/O shoulder surgery     History of tennis elbow s/p surgery b/l    S/P cholecystectomy     S/P knee surgery b/l    S/P lumbar fusion 2009

## 2024-05-07 NOTE — ASU PATIENT PROFILE, ADULT - NSICDXPASTMEDICALHX_GEN_ALL_CORE_FT
PAST MEDICAL HISTORY:  Chronic pain lumbar, left hip    Diabetes type 2, controlled     Gout     [place] (hard of hearing), bilateral b/l hearing aids    HTN (hypertension)     Hypercholesteremia     Meniscus tear     Seizure disorder on meds last seizure 2011    Septicemia Pt states he had spsis 2010 and was in hospital for 40 days, sent home on IV antibiotics    Spinal stenosis of thoracic region and lumbar region    Tennis elbow     Thoracic spondylosis      PAST MEDICAL HISTORY:  CAD (coronary artery disease)     Chronic pain lumbar, left hip    Diabetes type 2, controlled     DJD (degenerative joint disease)     Gout     [place] (hard of hearing), bilateral b/l hearing aids    HTN (hypertension)     Hypercholesteremia     Meniscus tear     PAD (peripheral artery disease)     Prostate CA     Seizure disorder on meds last seizure 2011    Septicemia Pt states he had spsis 2010 and was in hospital for 40 days, sent home on IV antibiotics    Spinal stenosis of thoracic region and lumbar region    Tennis elbow     Thoracic spondylosis

## 2024-05-08 ENCOUNTER — TRANSCRIPTION ENCOUNTER (OUTPATIENT)
Age: 79
End: 2024-05-08

## 2024-05-08 ENCOUNTER — OUTPATIENT (OUTPATIENT)
Dept: OUTPATIENT SERVICES | Facility: HOSPITAL | Age: 79
LOS: 1 days | End: 2024-05-08
Payer: MEDICARE

## 2024-05-08 VITALS
DIASTOLIC BLOOD PRESSURE: 70 MMHG | HEART RATE: 68 BPM | RESPIRATION RATE: 14 BRPM | TEMPERATURE: 98 F | SYSTOLIC BLOOD PRESSURE: 122 MMHG | HEIGHT: 68 IN | WEIGHT: 223.99 LBS | OXYGEN SATURATION: 96 %

## 2024-05-08 VITALS
SYSTOLIC BLOOD PRESSURE: 113 MMHG | TEMPERATURE: 97 F | HEART RATE: 71 BPM | DIASTOLIC BLOOD PRESSURE: 61 MMHG | OXYGEN SATURATION: 99 % | RESPIRATION RATE: 16 BRPM

## 2024-05-08 DIAGNOSIS — Z98.1 ARTHRODESIS STATUS: Chronic | ICD-10-CM

## 2024-05-08 DIAGNOSIS — Z98.89 OTHER SPECIFIED POSTPROCEDURAL STATES: Chronic | ICD-10-CM

## 2024-05-08 DIAGNOSIS — H25.11 AGE-RELATED NUCLEAR CATARACT, RIGHT EYE: ICD-10-CM

## 2024-05-08 DIAGNOSIS — Z98.890 OTHER SPECIFIED POSTPROCEDURAL STATES: Chronic | ICD-10-CM

## 2024-05-08 DIAGNOSIS — Z87.898 PERSONAL HISTORY OF OTHER SPECIFIED CONDITIONS: Chronic | ICD-10-CM

## 2024-05-08 DIAGNOSIS — Z90.49 ACQUIRED ABSENCE OF OTHER SPECIFIED PARTS OF DIGESTIVE TRACT: Chronic | ICD-10-CM

## 2024-05-08 LAB — GLUCOSE BLDC GLUCOMTR-MCNC: 151 MG/DL — HIGH (ref 70–99)

## 2024-05-08 PROCEDURE — 82962 GLUCOSE BLOOD TEST: CPT

## 2024-05-08 PROCEDURE — 66984 XCAPSL CTRC RMVL W/O ECP: CPT | Mod: RT

## 2024-05-08 PROCEDURE — V2632: CPT

## 2024-05-08 DEVICE — LENS IOL ACRYSOF SN60WF 22.0D
Type: IMPLANTABLE DEVICE | Site: RIGHT | Status: NON-FUNCTIONAL
Removed: 2024-05-08

## 2024-05-08 RX ORDER — LATANOPROST 0.05 MG/ML
1 SOLUTION/ DROPS OPHTHALMIC; TOPICAL
Refills: 0 | DISCHARGE

## 2024-05-08 RX ORDER — METOPROLOL TARTRATE 50 MG
1 TABLET ORAL
Refills: 0 | DISCHARGE

## 2024-05-08 RX ORDER — PHENYLEPHRINE HCL 2.5 %
1 DROPS OPHTHALMIC (EYE)
Refills: 0 | Status: COMPLETED | OUTPATIENT
Start: 2024-05-08 | End: 2024-05-08

## 2024-05-08 RX ORDER — KETOROLAC TROMETHAMINE 0.5 %
1 DROPS OPHTHALMIC (EYE)
Refills: 0 | Status: COMPLETED | OUTPATIENT
Start: 2024-05-08 | End: 2024-05-08

## 2024-05-08 RX ORDER — TROPICAMIDE 1 %
1 DROPS OPHTHALMIC (EYE)
Refills: 0 | Status: COMPLETED | OUTPATIENT
Start: 2024-05-08 | End: 2024-05-08

## 2024-05-08 RX ORDER — SITAGLIPTIN 50 MG/1
1 TABLET, FILM COATED ORAL
Refills: 0 | DISCHARGE

## 2024-05-08 RX ORDER — ALOGLIPTIN 12.5 MG/1
1 TABLET, FILM COATED ORAL
Refills: 0 | DISCHARGE

## 2024-05-08 RX ORDER — METFORMIN HYDROCHLORIDE 850 MG/1
1 TABLET ORAL
Refills: 0 | DISCHARGE

## 2024-05-08 RX ORDER — SODIUM CHLORIDE 9 MG/ML
1000 INJECTION, SOLUTION INTRAVENOUS
Refills: 0 | Status: DISCONTINUED | OUTPATIENT
Start: 2024-05-08 | End: 2024-05-08

## 2024-05-08 RX ORDER — ATORVASTATIN CALCIUM 80 MG/1
1 TABLET, FILM COATED ORAL
Refills: 0 | DISCHARGE

## 2024-05-08 RX ORDER — ACETAMINOPHEN 500 MG
650 TABLET ORAL ONCE
Refills: 0 | Status: DISCONTINUED | OUTPATIENT
Start: 2024-05-08 | End: 2024-05-22

## 2024-05-08 RX ADMIN — Medication 1 DROP(S): at 06:20

## 2024-05-08 RX ADMIN — Medication 1 DROP(S): at 06:25

## 2024-05-08 RX ADMIN — Medication 1 DROP(S): at 06:30

## 2024-05-08 RX ADMIN — SODIUM CHLORIDE 40 MILLILITER(S): 9 INJECTION, SOLUTION INTRAVENOUS at 06:23

## 2024-05-08 NOTE — ASU DISCHARGE PLAN (ADULT/PEDIATRIC) - ASU DC SPECIAL INSTRUCTIONSFT
Keep shield on eye until office visit today at 215 pm  Any problems call office at [phone]    OFFICE VISIT TODAY  pm  Bring Drops

## 2024-05-08 NOTE — BRIEF OPERATIVE NOTE - NSICDXBRIEFPROCEDURE_GEN_ALL_CORE_FT
PROCEDURES:  Single stage extracapsular removal of cataract with insertion of intraocular lens prosthesis by phacoemulsification 08-May-2024 08:16:05  Armando Partida

## 2024-05-08 NOTE — ASU DISCHARGE PLAN (ADULT/PEDIATRIC) - NS MD DC FALL RISK RISK
For information on Fall & Injury Prevention, visit: [email or web address] OR  [email or web address] OR  https://www.cdc.gov/steadi/patient.html

## 2024-05-28 ENCOUNTER — APPOINTMENT (OUTPATIENT)
Dept: MRI IMAGING | Facility: HOSPITAL | Age: 79
End: 2024-05-28
Payer: MEDICARE

## 2024-05-28 ENCOUNTER — APPOINTMENT (OUTPATIENT)
Dept: CT IMAGING | Facility: HOSPITAL | Age: 79
End: 2024-05-28
Payer: MEDICARE

## 2024-05-28 ENCOUNTER — RESULT REVIEW (OUTPATIENT)
Age: 79
End: 2024-05-28

## 2024-05-28 ENCOUNTER — OUTPATIENT (OUTPATIENT)
Dept: OUTPATIENT SERVICES | Facility: HOSPITAL | Age: 79
LOS: 1 days | End: 2024-05-28
Payer: MEDICARE

## 2024-05-28 DIAGNOSIS — Z98.1 ARTHRODESIS STATUS: Chronic | ICD-10-CM

## 2024-05-28 DIAGNOSIS — M48.07 SPINAL STENOSIS, LUMBOSACRAL REGION: ICD-10-CM

## 2024-05-28 DIAGNOSIS — M50.30 OTHER CERVICAL DISC DEGENERATION, UNSPECIFIED CERVICAL REGION: ICD-10-CM

## 2024-05-28 DIAGNOSIS — Z87.898 PERSONAL HISTORY OF OTHER SPECIFIED CONDITIONS: Chronic | ICD-10-CM

## 2024-05-28 DIAGNOSIS — Z90.49 ACQUIRED ABSENCE OF OTHER SPECIFIED PARTS OF DIGESTIVE TRACT: Chronic | ICD-10-CM

## 2024-05-28 PROBLEM — M19.90 UNSPECIFIED OSTEOARTHRITIS, UNSPECIFIED SITE: Chronic | Status: ACTIVE | Noted: 2024-05-07

## 2024-05-28 PROBLEM — I73.9 PERIPHERAL VASCULAR DISEASE, UNSPECIFIED: Chronic | Status: ACTIVE | Noted: 2024-05-07

## 2024-05-28 PROBLEM — I25.10 ATHEROSCLEROTIC HEART DISEASE OF NATIVE CORONARY ARTERY WITHOUT ANGINA PECTORIS: Chronic | Status: ACTIVE | Noted: 2024-05-07

## 2024-05-28 PROBLEM — C61 MALIGNANT NEOPLASM OF PROSTATE: Chronic | Status: ACTIVE | Noted: 2024-05-07

## 2024-05-28 PROCEDURE — 72128 CT CHEST SPINE W/O DYE: CPT | Mod: 26,MH

## 2024-05-28 PROCEDURE — 72131 CT LUMBAR SPINE W/O DYE: CPT

## 2024-05-28 PROCEDURE — 72131 CT LUMBAR SPINE W/O DYE: CPT | Mod: 26,MH

## 2024-05-28 PROCEDURE — 72128 CT CHEST SPINE W/O DYE: CPT

## 2024-06-05 ENCOUNTER — APPOINTMENT (OUTPATIENT)
Dept: MRI IMAGING | Facility: CLINIC | Age: 79
End: 2024-06-05
Payer: MEDICARE

## 2024-06-05 ENCOUNTER — OUTPATIENT (OUTPATIENT)
Dept: OUTPATIENT SERVICES | Facility: HOSPITAL | Age: 79
LOS: 1 days | End: 2024-06-05
Payer: MEDICARE

## 2024-06-05 DIAGNOSIS — M50.30 OTHER CERVICAL DISC DEGENERATION, UNSPECIFIED CERVICAL REGION: ICD-10-CM

## 2024-06-05 DIAGNOSIS — Z98.1 ARTHRODESIS STATUS: Chronic | ICD-10-CM

## 2024-06-05 DIAGNOSIS — M48.07 SPINAL STENOSIS, LUMBOSACRAL REGION: ICD-10-CM

## 2024-06-05 DIAGNOSIS — Z90.49 ACQUIRED ABSENCE OF OTHER SPECIFIED PARTS OF DIGESTIVE TRACT: Chronic | ICD-10-CM

## 2024-06-05 PROCEDURE — 72158 MRI LUMBAR SPINE W/O & W/DYE: CPT

## 2024-06-05 PROCEDURE — 72158 MRI LUMBAR SPINE W/O & W/DYE: CPT | Mod: 26,MH

## 2024-06-05 PROCEDURE — 72157 MRI CHEST SPINE W/O & W/DYE: CPT

## 2024-06-05 PROCEDURE — 72157 MRI CHEST SPINE W/O & W/DYE: CPT | Mod: 26,MH

## 2024-06-05 PROCEDURE — A9585: CPT

## 2024-06-15 PROBLEM — M48.07 LUMBOSACRAL STENOSIS: Status: ACTIVE | Noted: 2024-05-02

## 2024-06-15 PROBLEM — M50.30 DEGENERATIVE CERVICAL DISC: Status: ACTIVE | Noted: 2024-05-02

## 2024-06-17 ENCOUNTER — APPOINTMENT (OUTPATIENT)
Dept: ORTHOPEDIC SURGERY | Facility: CLINIC | Age: 79
End: 2024-06-17
Payer: MEDICARE

## 2024-06-17 VITALS — HEIGHT: 67.99 IN | WEIGHT: 213 LBS | BODY MASS INDEX: 32.28 KG/M2

## 2024-06-17 DIAGNOSIS — M48.04 SPINAL STENOSIS, THORACIC REGION: ICD-10-CM

## 2024-06-17 DIAGNOSIS — M51.36 OTHER INTERVERTEBRAL DISC DEGENERATION, LUMBAR REGION: ICD-10-CM

## 2024-06-17 DIAGNOSIS — M48.07 SPINAL STENOSIS, LUMBOSACRAL REGION: ICD-10-CM

## 2024-06-17 DIAGNOSIS — M54.16 RADICULOPATHY, LUMBAR REGION: ICD-10-CM

## 2024-06-17 DIAGNOSIS — M50.30 OTHER CERVICAL DISC DEGENERATION, UNSPECIFIED CERVICAL REGION: ICD-10-CM

## 2024-06-17 PROCEDURE — 99214 OFFICE O/P EST MOD 30 MIN: CPT

## 2024-07-03 ENCOUNTER — APPOINTMENT (OUTPATIENT)
Dept: PAIN MANAGEMENT | Facility: CLINIC | Age: 79
End: 2024-07-03
Payer: MEDICARE

## 2024-07-03 DIAGNOSIS — M54.14 RADICULOPATHY, THORACIC REGION: ICD-10-CM

## 2024-07-03 PROCEDURE — 99214 OFFICE O/P EST MOD 30 MIN: CPT

## 2024-07-10 ENCOUNTER — TRANSCRIPTION ENCOUNTER (OUTPATIENT)
Age: 79
End: 2024-07-10

## 2024-07-10 NOTE — ASU PATIENT PROFILE, ADULT - FALL HARM RISK - UNIVERSAL INTERVENTIONS
Bed in lowest position, wheels locked, appropriate side rails in place/Call bell, personal items and telephone in reach/Instruct patient to call for assistance before getting out of bed or chair/Non-slip footwear when patient is out of bed/[place] to call system/Physically safe environment - no spills, clutter or unnecessary equipment/Purposeful Proactive Rounding/Room/bathroom lighting operational, light cord in reach

## 2024-07-10 NOTE — ASU PATIENT PROFILE, ADULT - NSICDXPASTSURGICALHX_GEN_ALL_CORE_FT
PAST SURGICAL HISTORY:  H/O cardiac catheterization 2010 c/o tightness chest , cardiac workup normal    H/O shoulder surgery     History of tennis elbow s/p surgery b/l    S/P cholecystectomy     S/P knee surgery b/l    S/P lumbar fusion 2009

## 2024-07-10 NOTE — ASU PATIENT PROFILE, ADULT - NSICDXPASTMEDICALHX_GEN_ALL_CORE_FT
PAST MEDICAL HISTORY:  CAD (coronary artery disease)     Chronic pain lumbar, left hip    Diabetes type 2, controlled     DJD (degenerative joint disease)     Gout     [place] (hard of hearing), bilateral b/l hearing aids    HTN (hypertension)     Hypercholesteremia     Meniscus tear     PAD (peripheral artery disease)     Prostate CA     Seizure disorder on meds last seizure 2011    Septicemia Pt states he had spsis 2010 and was in hospital for 40 days, sent home on IV antibiotics    Spinal stenosis of thoracic region and lumbar region    Tennis elbow     Thoracic spondylosis

## 2024-07-15 ENCOUNTER — APPOINTMENT (OUTPATIENT)
Dept: ORTHOPEDIC SURGERY | Facility: HOSPITAL | Age: 79
End: 2024-07-15
Payer: MEDICARE

## 2024-07-15 ENCOUNTER — OUTPATIENT (OUTPATIENT)
Dept: OUTPATIENT SERVICES | Facility: HOSPITAL | Age: 79
LOS: 1 days | End: 2024-07-15
Payer: MEDICARE

## 2024-07-15 VITALS
TEMPERATURE: 98 F | HEART RATE: 66 BPM | DIASTOLIC BLOOD PRESSURE: 70 MMHG | RESPIRATION RATE: 12 BRPM | OXYGEN SATURATION: 97 % | HEIGHT: 68 IN | WEIGHT: 203.05 LBS | SYSTOLIC BLOOD PRESSURE: 117 MMHG

## 2024-07-15 VITALS
TEMPERATURE: 98 F | DIASTOLIC BLOOD PRESSURE: 54 MMHG | HEART RATE: 71 BPM | OXYGEN SATURATION: 100 % | SYSTOLIC BLOOD PRESSURE: 139 MMHG | RESPIRATION RATE: 20 BRPM

## 2024-07-15 DIAGNOSIS — Z98.89 OTHER SPECIFIED POSTPROCEDURAL STATES: Chronic | ICD-10-CM

## 2024-07-15 DIAGNOSIS — Z90.49 ACQUIRED ABSENCE OF OTHER SPECIFIED PARTS OF DIGESTIVE TRACT: Chronic | ICD-10-CM

## 2024-07-15 DIAGNOSIS — Z98.890 OTHER SPECIFIED POSTPROCEDURAL STATES: Chronic | ICD-10-CM

## 2024-07-15 DIAGNOSIS — Z87.898 PERSONAL HISTORY OF OTHER SPECIFIED CONDITIONS: Chronic | ICD-10-CM

## 2024-07-15 DIAGNOSIS — M54.14 RADICULOPATHY, THORACIC REGION: ICD-10-CM

## 2024-07-15 DIAGNOSIS — Z98.1 ARTHRODESIS STATUS: Chronic | ICD-10-CM

## 2024-07-15 LAB — GLUCOSE BLDC GLUCOMTR-MCNC: 146 MG/DL — HIGH (ref 70–99)

## 2024-07-15 PROCEDURE — 82962 GLUCOSE BLOOD TEST: CPT

## 2024-07-15 PROCEDURE — 62321 NJX INTERLAMINAR CRV/THRC: CPT

## 2024-08-26 ENCOUNTER — NON-APPOINTMENT (OUTPATIENT)
Age: 79
End: 2024-08-26

## 2024-08-27 DIAGNOSIS — L02.91 CUTANEOUS ABSCESS, UNSPECIFIED: ICD-10-CM

## 2024-08-27 DIAGNOSIS — M48.00 SPINAL STENOSIS, SITE UNSPECIFIED: ICD-10-CM

## 2024-08-27 DIAGNOSIS — E11.9 TYPE 2 DIABETES MELLITUS W/OUT COMPLICATIONS: ICD-10-CM

## 2024-08-27 DIAGNOSIS — E78.00 PURE HYPERCHOLESTEROLEMIA, UNSPECIFIED: ICD-10-CM

## 2024-08-27 DIAGNOSIS — L20.9 ATOPIC DERMATITIS, UNSPECIFIED: ICD-10-CM

## 2024-08-27 DIAGNOSIS — L85.3 XEROSIS CUTIS: ICD-10-CM

## 2024-08-27 DIAGNOSIS — L30.8 OTHER SPECIFIED DERMATITIS: ICD-10-CM

## 2024-08-27 DIAGNOSIS — L82.1 OTHER SEBORRHEIC KERATOSIS: ICD-10-CM

## 2024-08-27 DIAGNOSIS — G40.909 EPILEPSY, UNSPECIFIED, NOT INTRACTABLE, W/OUT STATUS EPILEPTICUS: ICD-10-CM

## 2024-08-27 DIAGNOSIS — L57.8 OTHER SKIN CHANGES DUE TO CHRONIC EXPOSURE TO NONIONIZING RADIATION: ICD-10-CM

## 2024-10-10 ENCOUNTER — LABORATORY RESULT (OUTPATIENT)
Age: 79
End: 2024-10-10

## 2024-10-23 ENCOUNTER — APPOINTMENT (OUTPATIENT)
Dept: DERMATOLOGY | Facility: CLINIC | Age: 79
End: 2024-10-23
Payer: MEDICARE

## 2024-10-23 DIAGNOSIS — L57.0 ACTINIC KERATOSIS: ICD-10-CM

## 2024-10-23 PROCEDURE — 17000 DESTRUCT PREMALG LESION: CPT

## 2024-10-23 PROCEDURE — 17003 DESTRUCT PREMALG LES 2-14: CPT

## 2024-10-28 ENCOUNTER — OUTPATIENT (OUTPATIENT)
Dept: OUTPATIENT SERVICES | Facility: HOSPITAL | Age: 79
LOS: 1 days | Discharge: HOME | End: 2024-10-28

## 2024-10-28 DIAGNOSIS — Z87.898 PERSONAL HISTORY OF OTHER SPECIFIED CONDITIONS: Chronic | ICD-10-CM

## 2024-10-28 DIAGNOSIS — Z98.89 OTHER SPECIFIED POSTPROCEDURAL STATES: Chronic | ICD-10-CM

## 2024-10-28 DIAGNOSIS — Z90.49 ACQUIRED ABSENCE OF OTHER SPECIFIED PARTS OF DIGESTIVE TRACT: Chronic | ICD-10-CM

## 2024-10-28 DIAGNOSIS — D64.9 ANEMIA, UNSPECIFIED: ICD-10-CM

## 2024-10-28 DIAGNOSIS — Z98.890 OTHER SPECIFIED POSTPROCEDURAL STATES: Chronic | ICD-10-CM

## 2024-10-28 DIAGNOSIS — Z98.1 ARTHRODESIS STATUS: Chronic | ICD-10-CM

## 2024-10-30 ENCOUNTER — NON-APPOINTMENT (OUTPATIENT)
Age: 79
End: 2024-10-30

## 2024-10-30 ENCOUNTER — APPOINTMENT (OUTPATIENT)
Dept: HEMATOLOGY ONCOLOGY | Facility: CLINIC | Age: 79
End: 2024-10-30
Payer: MEDICARE

## 2024-10-30 VITALS
RESPIRATION RATE: 17 BRPM | TEMPERATURE: 97.4 F | SYSTOLIC BLOOD PRESSURE: 97 MMHG | DIASTOLIC BLOOD PRESSURE: 59 MMHG | BODY MASS INDEX: 32.28 KG/M2 | HEIGHT: 67.99 IN | WEIGHT: 213 LBS | OXYGEN SATURATION: 97 % | HEART RATE: 76 BPM

## 2024-10-30 DIAGNOSIS — D47.2 MONOCLONAL GAMMOPATHY: ICD-10-CM

## 2024-10-30 PROCEDURE — 99213 OFFICE O/P EST LOW 20 MIN: CPT

## 2024-10-30 PROCEDURE — G2211 COMPLEX E/M VISIT ADD ON: CPT

## 2024-11-05 ENCOUNTER — APPOINTMENT (OUTPATIENT)
Dept: NEUROLOGY | Facility: CLINIC | Age: 79
End: 2024-11-05
Payer: MEDICARE

## 2024-11-05 VITALS
HEIGHT: 68 IN | SYSTOLIC BLOOD PRESSURE: 100 MMHG | HEART RATE: 72 BPM | BODY MASS INDEX: 32.28 KG/M2 | DIASTOLIC BLOOD PRESSURE: 60 MMHG | WEIGHT: 213 LBS

## 2024-11-05 DIAGNOSIS — M54.16 RADICULOPATHY, LUMBAR REGION: ICD-10-CM

## 2024-11-05 PROCEDURE — 99214 OFFICE O/P EST MOD 30 MIN: CPT

## 2024-11-05 PROCEDURE — G2211 COMPLEX E/M VISIT ADD ON: CPT

## 2024-12-29 ENCOUNTER — NON-APPOINTMENT (OUTPATIENT)
Age: 79
End: 2024-12-29

## 2025-01-22 NOTE — ASU DISCHARGE PLAN (ADULT/PEDIATRIC) - HAVE YOU HAD COVID IN THE LAST 60 DAYS?
Detail Level: Detailed Depth Of Biopsy: dermis No Size Of Lesion In Cm (Optional): 0.7 X Size Of Lesion In Cm (Optional): 0 Biopsy Type: H and E Biopsy Method: Dermablade Anesthesia Type: 1% lidocaine with epinephrine Anesthesia Volume In Cc: 0.5 Hemostasis: Electrocautery Wound Care: Petrolatum Lab: [id number] Path Notes Override (Will Replace All Of The Above Text): Copy to Dr. Samira Heck Render Path Notes In Note?: No Consent: Written consent was obtained and risks were reviewed including but not limited to scarring, infection, bleeding, scabbing, incomplete removal, nerve damage and allergy to anesthesia. Post-Care Instructions: I reviewed with the patient in detail post-care instructions. Patient is to keep the biopsy site dry overnight, and then apply bacitracin twice daily until healed. Patient may apply hydrogen peroxide soaks to remove any crusting. Notification Instructions: Patient will be notified of biopsy results. However, patient instructed to call the office if not contacted within 2 weeks. Anticipated Plan (Based On Presumed Biopsy Results): Await results. [facility]s Billing Type: Third-Party Bill

## 2025-03-18 ENCOUNTER — OUTPATIENT (OUTPATIENT)
Dept: OUTPATIENT SERVICES | Facility: HOSPITAL | Age: 80
LOS: 1 days | End: 2025-03-18
Payer: MEDICARE

## 2025-03-18 ENCOUNTER — APPOINTMENT (OUTPATIENT)
Dept: RADIOLOGY | Facility: HOSPITAL | Age: 80
End: 2025-03-18
Payer: MEDICARE

## 2025-03-18 DIAGNOSIS — Z98.890 OTHER SPECIFIED POSTPROCEDURAL STATES: Chronic | ICD-10-CM

## 2025-03-18 DIAGNOSIS — Z98.89 OTHER SPECIFIED POSTPROCEDURAL STATES: Chronic | ICD-10-CM

## 2025-03-18 DIAGNOSIS — Z98.1 ARTHRODESIS STATUS: Chronic | ICD-10-CM

## 2025-03-18 DIAGNOSIS — Z87.898 PERSONAL HISTORY OF OTHER SPECIFIED CONDITIONS: Chronic | ICD-10-CM

## 2025-03-18 DIAGNOSIS — Z90.49 ACQUIRED ABSENCE OF OTHER SPECIFIED PARTS OF DIGESTIVE TRACT: Chronic | ICD-10-CM

## 2025-03-18 DIAGNOSIS — Z00.8 ENCOUNTER FOR OTHER GENERAL EXAMINATION: ICD-10-CM

## 2025-03-18 PROCEDURE — 73502 X-RAY EXAM HIP UNI 2-3 VIEWS: CPT

## 2025-03-18 PROCEDURE — 73502 X-RAY EXAM HIP UNI 2-3 VIEWS: CPT | Mod: 26,LT

## 2025-04-16 ENCOUNTER — LABORATORY RESULT (OUTPATIENT)
Age: 80
End: 2025-04-16

## 2025-04-25 ENCOUNTER — OUTPATIENT (OUTPATIENT)
Dept: OUTPATIENT SERVICES | Facility: HOSPITAL | Age: 80
LOS: 1 days | Discharge: HOME | End: 2025-04-25

## 2025-04-25 DIAGNOSIS — Z87.898 PERSONAL HISTORY OF OTHER SPECIFIED CONDITIONS: Chronic | ICD-10-CM

## 2025-04-25 DIAGNOSIS — Z98.89 OTHER SPECIFIED POSTPROCEDURAL STATES: Chronic | ICD-10-CM

## 2025-04-25 DIAGNOSIS — Z98.1 ARTHRODESIS STATUS: Chronic | ICD-10-CM

## 2025-04-25 DIAGNOSIS — D64.9 ANEMIA, UNSPECIFIED: ICD-10-CM

## 2025-04-25 DIAGNOSIS — Z98.890 OTHER SPECIFIED POSTPROCEDURAL STATES: Chronic | ICD-10-CM

## 2025-04-25 DIAGNOSIS — Z90.49 ACQUIRED ABSENCE OF OTHER SPECIFIED PARTS OF DIGESTIVE TRACT: Chronic | ICD-10-CM

## 2025-04-28 ENCOUNTER — NON-APPOINTMENT (OUTPATIENT)
Age: 80
End: 2025-04-28

## 2025-04-29 ENCOUNTER — APPOINTMENT (OUTPATIENT)
Dept: HEMATOLOGY ONCOLOGY | Facility: CLINIC | Age: 80
End: 2025-04-29
Payer: MEDICARE

## 2025-04-29 VITALS
WEIGHT: 207.65 LBS | RESPIRATION RATE: 18 BRPM | SYSTOLIC BLOOD PRESSURE: 95 MMHG | BODY MASS INDEX: 34.6 KG/M2 | TEMPERATURE: 97.2 F | HEART RATE: 56 BPM | HEIGHT: 65.08 IN | OXYGEN SATURATION: 97 % | DIASTOLIC BLOOD PRESSURE: 55 MMHG

## 2025-04-29 DIAGNOSIS — D47.2 MONOCLONAL GAMMOPATHY: ICD-10-CM

## 2025-04-29 PROCEDURE — 99213 OFFICE O/P EST LOW 20 MIN: CPT

## 2025-04-29 PROCEDURE — G2211 COMPLEX E/M VISIT ADD ON: CPT

## 2025-05-01 ENCOUNTER — APPOINTMENT (OUTPATIENT)
Dept: DERMATOLOGY | Facility: CLINIC | Age: 80
End: 2025-05-01
Payer: MEDICARE

## 2025-05-01 DIAGNOSIS — L30.8 OTHER SPECIFIED DERMATITIS: ICD-10-CM

## 2025-05-01 DIAGNOSIS — L57.8 OTHER SKIN CHANGES DUE TO CHRONIC EXPOSURE TO NONIONIZING RADIATION: ICD-10-CM

## 2025-05-01 DIAGNOSIS — L82.1 OTHER SEBORRHEIC KERATOSIS: ICD-10-CM

## 2025-05-01 PROCEDURE — 99212 OFFICE O/P EST SF 10 MIN: CPT

## 2025-06-03 NOTE — ASU PREOP CHECKLIST - DNR CLARIFICATION FORM COMPLETED
[facility]-services called to inform Dr Barriga that the patient was scheduled for the ordered CT lumber spine and CT thoracic spine tomorrow but has been cancelled due to the patient's insurance still being pending   n/a

## 2025-08-27 ENCOUNTER — APPOINTMENT (OUTPATIENT)
Dept: PAIN MANAGEMENT | Facility: CLINIC | Age: 80
End: 2025-08-27
Payer: MEDICARE

## 2025-08-27 VITALS — HEIGHT: 68 IN | BODY MASS INDEX: 31.22 KG/M2 | WEIGHT: 206 LBS

## 2025-08-27 DIAGNOSIS — M54.16 RADICULOPATHY, LUMBAR REGION: ICD-10-CM

## 2025-08-27 PROCEDURE — 99214 OFFICE O/P EST MOD 30 MIN: CPT

## 2025-09-03 ENCOUNTER — APPOINTMENT (OUTPATIENT)
Dept: MRI IMAGING | Facility: HOSPITAL | Age: 80
End: 2025-09-03
Payer: MEDICARE

## 2025-09-03 ENCOUNTER — OUTPATIENT (OUTPATIENT)
Dept: OUTPATIENT SERVICES | Facility: HOSPITAL | Age: 80
LOS: 1 days | End: 2025-09-03
Payer: MEDICARE

## 2025-09-03 DIAGNOSIS — Z98.1 ARTHRODESIS STATUS: Chronic | ICD-10-CM

## 2025-09-03 DIAGNOSIS — M54.2 CERVICALGIA: ICD-10-CM

## 2025-09-03 DIAGNOSIS — Z98.89 OTHER SPECIFIED POSTPROCEDURAL STATES: Chronic | ICD-10-CM

## 2025-09-03 DIAGNOSIS — Z90.49 ACQUIRED ABSENCE OF OTHER SPECIFIED PARTS OF DIGESTIVE TRACT: Chronic | ICD-10-CM

## 2025-09-03 DIAGNOSIS — Z98.890 OTHER SPECIFIED POSTPROCEDURAL STATES: Chronic | ICD-10-CM

## 2025-09-03 DIAGNOSIS — Z87.898 PERSONAL HISTORY OF OTHER SPECIFIED CONDITIONS: Chronic | ICD-10-CM

## 2025-09-03 PROCEDURE — 72141 MRI NECK SPINE W/O DYE: CPT | Mod: MH

## 2025-09-03 PROCEDURE — 70551 MRI BRAIN STEM W/O DYE: CPT | Mod: MH

## 2025-09-03 PROCEDURE — 72141 MRI NECK SPINE W/O DYE: CPT | Mod: 26

## 2025-09-03 PROCEDURE — 70551 MRI BRAIN STEM W/O DYE: CPT | Mod: 26

## 2025-09-10 ENCOUNTER — TRANSCRIPTION ENCOUNTER (OUTPATIENT)
Age: 80
End: 2025-09-10

## 2025-09-11 ENCOUNTER — APPOINTMENT (OUTPATIENT)
Dept: ORTHOPEDIC SURGERY | Facility: HOSPITAL | Age: 80
End: 2025-09-11
Payer: MEDICARE

## 2025-09-11 PROCEDURE — 62323 NJX INTERLAMINAR LMBR/SAC: CPT

## 2025-09-19 ENCOUNTER — APPOINTMENT (OUTPATIENT)
Dept: PAIN MANAGEMENT | Facility: CLINIC | Age: 80
End: 2025-09-19
Payer: MEDICARE

## 2025-09-19 VITALS — BODY MASS INDEX: 31.22 KG/M2 | HEIGHT: 68 IN | WEIGHT: 206 LBS

## 2025-09-19 DIAGNOSIS — M54.16 RADICULOPATHY, LUMBAR REGION: ICD-10-CM

## 2025-09-19 PROCEDURE — 99213 OFFICE O/P EST LOW 20 MIN: CPT

## 2025-09-19 RX ORDER — DULOXETINE 20 MG/1
20 CAPSULE, DELAYED RELEASE ORAL
Qty: 90 | Refills: 0 | Status: ACTIVE | COMMUNITY
Start: 2025-09-19 | End: 1900-01-01

## (undated) DEVICE — CARTRIDGE ALCON MONARCH II "B"

## (undated) DEVICE — GLV 8.5 PROTEXIS (WHITE)

## (undated) DEVICE — STYLET  ENDOTRACH 7.5MM X 10MM

## (undated) DEVICE — CANNULA BD & CO SUBTENONS

## (undated) DEVICE — Device

## (undated) DEVICE — SOL IRR POUR H2O 1000ML

## (undated) DEVICE — DRSG TEGADERM 2.5X3"

## (undated) DEVICE — KNIFE SIDEPORT ANG W/ SAFETY 20G

## (undated) DEVICE — SYR IV FLUSH SALINE 10ML 30/TY

## (undated) DEVICE — TUBING ALARIS PUMP MODULE NON-DEHP

## (undated) DEVICE — AID SURG OPTH OCUCOAT 20MG/ML

## (undated) DEVICE — PACK CATARACT

## (undated) DEVICE — TRAY EPIDURAL SINGLE DOSE

## (undated) DEVICE — KNIFE FULL HANDLE ANGLE 2.75MM

## (undated) DEVICE — SUT VICRYL 10-0 12" CS160-8 DA

## (undated) DEVICE — TUBING IV EXTENSION MACRO W CLAVE 7"

## (undated) DEVICE — PACK IV START WITH CHG

## (undated) DEVICE — NUCLEUS HYDRODISSECTOR SAUTER 27G X 22MM

## (undated) DEVICE — GLV 6.5 PROTEXIS (WHITE)

## (undated) DEVICE — CATH IV SAFE BC 22G X 1" (BLUE)

## (undated) DEVICE — SYR LUER LOK 20CC

## (undated) DEVICE — SOL INJ LR 500ML

## (undated) DEVICE — DRSG STERISTRIPS 0.5 X 4"

## (undated) DEVICE — NDL SPINAL 22G X 3.5" QUINCKE

## (undated) DEVICE — CAPSULE POLISHER 23GX7/8"

## (undated) DEVICE — NDL HYPO SAFE 18G X 1.5" (PINK)